# Patient Record
Sex: FEMALE | Race: WHITE | Employment: FULL TIME | URBAN - METROPOLITAN AREA
[De-identification: names, ages, dates, MRNs, and addresses within clinical notes are randomized per-mention and may not be internally consistent; named-entity substitution may affect disease eponyms.]

---

## 2016-05-15 LAB
BASOPHILS ABSOLUTE: 0.1 /ΜL
BASOPHILS RELATIVE PERCENT: 1 %
EOSINOPHILS ABSOLUTE: 0.2 /ΜL
EOSINOPHILS RELATIVE PERCENT: 3 %
HCT VFR BLD CALC: 42.2 % (ref 36–46)
HEMOGLOBIN: 14.5 G/DL (ref 12–16)
LYMPHOCYTES ABSOLUTE: 1.6 /ΜL
LYMPHOCYTES RELATIVE PERCENT: 23 %
MCH RBC QN AUTO: 30.4 PG
MCHC RBC AUTO-ENTMCNC: 34.4 G/DL
MCV RBC AUTO: 89 FL
MONOCYTES ABSOLUTE: 0.5 /ΜL
MONOCYTES RELATIVE PERCENT: 7 %
NEUTROPHILS ABSOLUTE: 4.5 /ΜL
NEUTROPHILS RELATIVE PERCENT: 66 %
PDW BLD-RTO: 14 %
PLATELET # BLD: 231 K/ΜL
PMV BLD AUTO: NORMAL FL
RBC # BLD: 4.77 10^6/ΜL
WBC # BLD: 6.9 10^3/ML

## 2017-05-08 ENCOUNTER — TELEPHONE (OUTPATIENT)
Dept: PHYSICAL MEDICINE AND REHAB | Age: 57
End: 2017-05-08

## 2017-05-09 ENCOUNTER — OFFICE VISIT (OUTPATIENT)
Dept: PHYSICAL MEDICINE AND REHAB | Age: 57
End: 2017-05-09

## 2017-05-09 DIAGNOSIS — M79.601 PARESTHESIA AND PAIN OF BOTH UPPER EXTREMITIES: ICD-10-CM

## 2017-05-09 DIAGNOSIS — M54.12 C7 RADICULOPATHY: Primary | ICD-10-CM

## 2017-05-09 DIAGNOSIS — R20.2 PARESTHESIA AND PAIN OF BOTH UPPER EXTREMITIES: ICD-10-CM

## 2017-05-09 DIAGNOSIS — M79.602 PARESTHESIA AND PAIN OF BOTH UPPER EXTREMITIES: ICD-10-CM

## 2017-05-09 DIAGNOSIS — E11.42 DIABETIC SENSORIMOTOR POLYNEUROPATHY (HCC): ICD-10-CM

## 2017-05-09 PROCEDURE — 95886 MUSC TEST DONE W/N TEST COMP: CPT | Performed by: PHYSICAL MEDICINE & REHABILITATION

## 2017-05-09 PROCEDURE — 95911 NRV CNDJ TEST 9-10 STUDIES: CPT | Performed by: PHYSICAL MEDICINE & REHABILITATION

## 2018-04-06 ENCOUNTER — HOSPITAL ENCOUNTER (OUTPATIENT)
Dept: MAMMOGRAPHY | Age: 58
Discharge: HOME | End: 2018-04-06
Attending: FAMILY MEDICINE | Admitting: FAMILY MEDICINE

## 2018-04-06 DIAGNOSIS — Z12.31 SCREENING MAMMOGRAM, ENCOUNTER FOR: ICD-10-CM

## 2019-01-15 ENCOUNTER — OFFICE VISIT (OUTPATIENT)
Dept: INTERNAL MEDICINE CLINIC | Age: 59
End: 2019-01-15
Payer: COMMERCIAL

## 2019-01-15 ENCOUNTER — TELEPHONE (OUTPATIENT)
Dept: INTERNAL MEDICINE CLINIC | Age: 59
End: 2019-01-15

## 2019-01-15 VITALS
HEART RATE: 62 BPM | HEIGHT: 63 IN | DIASTOLIC BLOOD PRESSURE: 82 MMHG | OXYGEN SATURATION: 98 % | SYSTOLIC BLOOD PRESSURE: 130 MMHG | WEIGHT: 293 LBS | BODY MASS INDEX: 51.91 KG/M2

## 2019-01-15 DIAGNOSIS — E03.9 HYPOTHYROIDISM, UNSPECIFIED TYPE: ICD-10-CM

## 2019-01-15 DIAGNOSIS — E11.9 TYPE 2 DIABETES MELLITUS WITHOUT COMPLICATION, WITHOUT LONG-TERM CURRENT USE OF INSULIN (HCC): Primary | ICD-10-CM

## 2019-01-15 DIAGNOSIS — Z12.11 SCREENING FOR COLON CANCER: ICD-10-CM

## 2019-01-15 DIAGNOSIS — Z79.899 LONG TERM USE OF DRUG: ICD-10-CM

## 2019-01-15 DIAGNOSIS — J01.90 ACUTE NON-RECURRENT SINUSITIS, UNSPECIFIED LOCATION: ICD-10-CM

## 2019-01-15 DIAGNOSIS — Z11.59 SCREENING FOR VIRAL DISEASE: ICD-10-CM

## 2019-01-15 DIAGNOSIS — E78.5 HYPERLIPIDEMIA, UNSPECIFIED HYPERLIPIDEMIA TYPE: ICD-10-CM

## 2019-01-15 DIAGNOSIS — I10 ESSENTIAL HYPERTENSION: ICD-10-CM

## 2019-01-15 DIAGNOSIS — M25.562 ACUTE PAIN OF LEFT KNEE: ICD-10-CM

## 2019-01-15 PROCEDURE — 99214 OFFICE O/P EST MOD 30 MIN: CPT | Performed by: FAMILY MEDICINE

## 2019-01-15 RX ORDER — FLUTICASONE PROPIONATE 50 MCG
SPRAY, SUSPENSION (ML) NASAL
COMMUNITY
Start: 2019-01-02 | End: 2019-01-15

## 2019-01-15 RX ORDER — HYDROCHLOROTHIAZIDE 25 MG/1
50 TABLET ORAL DAILY
COMMUNITY
End: 2019-03-25 | Stop reason: SDUPTHER

## 2019-01-15 RX ORDER — LEVOTHYROXINE SODIUM 175 UG/1
175 TABLET ORAL DAILY
COMMUNITY
End: 2019-03-25 | Stop reason: SDUPTHER

## 2019-01-15 RX ORDER — LOSARTAN POTASSIUM 100 MG/1
100 TABLET ORAL DAILY
COMMUNITY
End: 2019-03-25 | Stop reason: SDUPTHER

## 2019-01-15 RX ORDER — PAROXETINE 10 MG/1
10 TABLET, FILM COATED ORAL EVERY MORNING
COMMUNITY
End: 2019-03-25 | Stop reason: SDUPTHER

## 2019-01-15 RX ORDER — AMLODIPINE BESYLATE 5 MG/1
TABLET ORAL
COMMUNITY
Start: 2018-12-30 | End: 2019-01-15

## 2019-01-15 RX ORDER — LEVOTHYROXINE SODIUM 175 MCG
TABLET ORAL
COMMUNITY
Start: 2019-01-13 | End: 2019-01-15

## 2019-01-15 RX ORDER — GLYBURIDE 5 MG/1
TABLET ORAL
COMMUNITY
Start: 2018-11-04 | End: 2019-01-15

## 2019-01-15 RX ORDER — GLYBURIDE 5 MG/1
5 TABLET ORAL
COMMUNITY
End: 2019-01-25 | Stop reason: SDUPTHER

## 2019-01-15 RX ORDER — AMLODIPINE BESYLATE 5 MG/1
5 TABLET ORAL DAILY
COMMUNITY
End: 2019-03-25 | Stop reason: SDUPTHER

## 2019-01-15 RX ORDER — MONTELUKAST SODIUM 10 MG/1
TABLET ORAL
COMMUNITY
Start: 2018-12-16 | End: 2019-01-15

## 2019-01-15 RX ORDER — PAROXETINE 10 MG/1
TABLET, FILM COATED ORAL
COMMUNITY
Start: 2018-10-20 | End: 2019-01-15

## 2019-01-15 RX ORDER — AZITHROMYCIN 250 MG/1
250 TABLET, FILM COATED ORAL SEE ADMIN INSTRUCTIONS
Qty: 6 TABLET | Refills: 0 | Status: SHIPPED | OUTPATIENT
Start: 2019-01-15 | End: 2019-01-20

## 2019-01-15 ASSESSMENT — PATIENT HEALTH QUESTIONNAIRE - PHQ9
2. FEELING DOWN, DEPRESSED OR HOPELESS: 0
1. LITTLE INTEREST OR PLEASURE IN DOING THINGS: 0
SUM OF ALL RESPONSES TO PHQ QUESTIONS 1-9: 0
SUM OF ALL RESPONSES TO PHQ9 QUESTIONS 1 & 2: 0
SUM OF ALL RESPONSES TO PHQ QUESTIONS 1-9: 0

## 2019-01-20 ASSESSMENT — ENCOUNTER SYMPTOMS
RHINORRHEA: 1
SHORTNESS OF BREATH: 0
BACK PAIN: 0
NAUSEA: 0
COUGH: 0
SINUS PAIN: 1
ABDOMINAL PAIN: 0

## 2019-01-25 RX ORDER — GLYBURIDE 5 MG/1
TABLET ORAL
Qty: 270 TABLET | Refills: 0 | Status: SHIPPED | OUTPATIENT
Start: 2019-01-25 | End: 2019-03-25 | Stop reason: SDUPTHER

## 2019-02-18 ENCOUNTER — TELEPHONE (OUTPATIENT)
Dept: INTERNAL MEDICINE CLINIC | Age: 59
End: 2019-02-18

## 2019-02-19 DIAGNOSIS — Z12.11 SCREENING FOR COLON CANCER: ICD-10-CM

## 2019-03-25 RX ORDER — LEVOTHYROXINE SODIUM 175 UG/1
175 TABLET ORAL DAILY
Qty: 90 TABLET | Refills: 0 | Status: SHIPPED | OUTPATIENT
Start: 2019-03-25 | End: 2019-08-06 | Stop reason: SDUPTHER

## 2019-03-25 RX ORDER — HYDROCHLOROTHIAZIDE 25 MG/1
50 TABLET ORAL DAILY
Qty: 180 TABLET | Refills: 0 | Status: SHIPPED | OUTPATIENT
Start: 2019-03-25 | End: 2019-06-24 | Stop reason: SDUPTHER

## 2019-03-25 RX ORDER — LOSARTAN POTASSIUM 100 MG/1
100 TABLET ORAL DAILY
Qty: 90 TABLET | Refills: 0 | Status: SHIPPED | OUTPATIENT
Start: 2019-03-25 | End: 2019-08-06 | Stop reason: SDUPTHER

## 2019-03-25 RX ORDER — GLYBURIDE 5 MG/1
TABLET ORAL
Qty: 270 TABLET | Refills: 0 | Status: SHIPPED | OUTPATIENT
Start: 2019-03-25 | End: 2019-07-28 | Stop reason: SDUPTHER

## 2019-03-25 RX ORDER — AMLODIPINE BESYLATE 5 MG/1
5 TABLET ORAL DAILY
Qty: 90 TABLET | Refills: 0 | Status: SHIPPED | OUTPATIENT
Start: 2019-03-25 | End: 2019-06-24 | Stop reason: SDUPTHER

## 2019-03-25 RX ORDER — PAROXETINE 10 MG/1
10 TABLET, FILM COATED ORAL EVERY MORNING
Qty: 90 TABLET | Refills: 0 | Status: SHIPPED | OUTPATIENT
Start: 2019-03-25 | End: 2020-08-24

## 2019-03-26 DIAGNOSIS — M25.562 ACUTE PAIN OF LEFT KNEE: ICD-10-CM

## 2019-04-06 LAB
ANTIBODY: 0.2
AVERAGE GLUCOSE: ABNORMAL
CHOLESTEROL, TOTAL: 206 MG/DL
CHOLESTEROL/HDL RATIO: ABNORMAL
HBA1C MFR BLD: 8.6 %
HDLC SERPL-MCNC: 41 MG/DL (ref 35–70)
LDL CHOLESTEROL CALCULATED: 128 MG/DL (ref 0–160)
T4 FREE: 1.61
TRIGL SERPL-MCNC: 183 MG/DL
TSH SERPL DL<=0.05 MIU/L-ACNC: 1.31 UIU/ML
VLDLC SERPL CALC-MCNC: 37 MG/DL

## 2019-04-08 LAB
ALBUMIN SERPL-MCNC: 4.6 G/DL
ALP BLD-CCNC: 77 U/L
ALT SERPL-CCNC: 34 U/L
ANION GAP SERPL CALCULATED.3IONS-SCNC: NORMAL MMOL/L
AST SERPL-CCNC: 31 U/L
BILIRUB SERPL-MCNC: 0.9 MG/DL (ref 0.1–1.4)
BUN BLDV-MCNC: 16 MG/DL
CALCIUM SERPL-MCNC: 10.1 MG/DL
CHLORIDE BLD-SCNC: 100 MMOL/L
CO2: 22 MMOL/L
CREAT SERPL-MCNC: 0.67 MG/DL
GFR CALCULATED: 97
GLUCOSE BLD-MCNC: 221 MG/DL
POTASSIUM SERPL-SCNC: 3.7 MMOL/L
SODIUM BLD-SCNC: 143 MMOL/L
TOTAL PROTEIN: 8.1

## 2019-04-09 ENCOUNTER — HOSPITAL ENCOUNTER (OUTPATIENT)
Dept: MAMMOGRAPHY | Age: 59
Discharge: HOME | End: 2019-04-09
Payer: COMMERCIAL

## 2019-04-09 DIAGNOSIS — Z12.31 ENCOUNTER FOR SCREENING MAMMOGRAM FOR BREAST CANCER: ICD-10-CM

## 2019-04-09 PROCEDURE — 77067 SCR MAMMO BI INCL CAD: CPT

## 2019-04-15 ENCOUNTER — OFFICE VISIT (OUTPATIENT)
Dept: INTERNAL MEDICINE CLINIC | Age: 59
End: 2019-04-15
Payer: COMMERCIAL

## 2019-04-15 VITALS
BODY MASS INDEX: 51.91 KG/M2 | WEIGHT: 293 LBS | OXYGEN SATURATION: 98 % | HEART RATE: 76 BPM | HEIGHT: 63 IN | DIASTOLIC BLOOD PRESSURE: 88 MMHG | SYSTOLIC BLOOD PRESSURE: 138 MMHG

## 2019-04-15 DIAGNOSIS — E78.5 HYPERLIPIDEMIA, UNSPECIFIED HYPERLIPIDEMIA TYPE: ICD-10-CM

## 2019-04-15 DIAGNOSIS — E11.65 TYPE 2 DIABETES MELLITUS WITH HYPERGLYCEMIA, WITHOUT LONG-TERM CURRENT USE OF INSULIN (HCC): Primary | ICD-10-CM

## 2019-04-15 DIAGNOSIS — Z79.899 LONG TERM USE OF DRUG: ICD-10-CM

## 2019-04-15 DIAGNOSIS — E03.9 HYPOTHYROIDISM, UNSPECIFIED TYPE: ICD-10-CM

## 2019-04-15 DIAGNOSIS — R05.9 COUGH: ICD-10-CM

## 2019-04-15 DIAGNOSIS — J01.00 ACUTE NON-RECURRENT MAXILLARY SINUSITIS: ICD-10-CM

## 2019-04-15 PROCEDURE — 99214 OFFICE O/P EST MOD 30 MIN: CPT | Performed by: FAMILY MEDICINE

## 2019-04-15 RX ORDER — PRAVASTATIN SODIUM 40 MG
40 TABLET ORAL DAILY
Qty: 90 TABLET | Refills: 0 | Status: SHIPPED | OUTPATIENT
Start: 2019-04-15 | End: 2019-06-24 | Stop reason: SDUPTHER

## 2019-04-15 RX ORDER — AZITHROMYCIN 250 MG/1
250 TABLET, FILM COATED ORAL SEE ADMIN INSTRUCTIONS
Qty: 6 TABLET | Refills: 0 | Status: SHIPPED | OUTPATIENT
Start: 2019-04-15 | End: 2019-04-20

## 2019-04-15 RX ORDER — BENZONATATE 200 MG/1
200 CAPSULE ORAL 3 TIMES DAILY PRN
Qty: 30 CAPSULE | Refills: 0 | Status: SHIPPED | OUTPATIENT
Start: 2019-04-15 | End: 2019-04-22

## 2019-04-20 ASSESSMENT — ENCOUNTER SYMPTOMS
EYES NEGATIVE: 1
ABDOMINAL PAIN: 0
COUGH: 1
CHEST TIGHTNESS: 0
WHEEZING: 0
RHINORRHEA: 1
BACK PAIN: 0
SHORTNESS OF BREATH: 0
NAUSEA: 0
BLOOD IN STOOL: 0
SINUS PRESSURE: 1

## 2019-04-20 NOTE — PROGRESS NOTES
Topics    Alcohol use: No    Drug use: No       Current Outpatient Medications   Medication Sig Dispense Refill    benzonatate (TESSALON) 200 MG capsule Take 1 capsule by mouth 3 times daily as needed for Cough 30 capsule 0    Dulaglutide (TRULICITY) 0.22 ZD/4.3CO SOPN Inject 0.75 mg into the skin once a week 1 pen 2    pravastatin (PRAVACHOL) 40 MG tablet Take 1 tablet by mouth daily 90 tablet 0    azithromycin (ZITHROMAX) 250 MG tablet Take 1 tablet by mouth See Admin Instructions for 5 days 500mg on day 1 followed by 250mg on days 2 - 5 6 tablet 0    glyBURIDE (DIABETA) 5 MG tablet Take 1 tablet by mouth every morning and 2 tablets every evening with meals 270 tablet 0    losartan (COZAAR) 100 MG tablet Take 1 tablet by mouth daily 90 tablet 0    amLODIPine (NORVASC) 5 MG tablet Take 1 tablet by mouth daily 90 tablet 0    levothyroxine (SYNTHROID) 175 MCG tablet Take 1 tablet by mouth Daily 90 tablet 0    hydrochlorothiazide (HYDRODIURIL) 25 MG tablet Take 2 tablets by mouth daily 180 tablet 0    metFORMIN (GLUCOPHAGE) 1000 MG tablet Take 1 tablet by mouth 2 times daily (with meals) 180 tablet 0    PARoxetine (PAXIL) 10 MG tablet Take 1 tablet by mouth every morning 90 tablet 0     No current facility-administered medications for this visit. OBJECTIVE:    /88 (Site: Right Upper Arm, Position: Sitting, Cuff Size: Medium Adult)   Pulse 76   Ht 5' 2.99\" (1.6 m)   Wt (!) 310 lb (140.6 kg)   SpO2 98%   BMI 54.93 kg/m²     Physical Exam   Constitutional: She is oriented to person, place, and time. She appears well-developed. No distress. HENT:   Right Ear: External ear normal.   Left Ear: External ear normal.   Nose: Rhinorrhea and sinus tenderness present. Mouth/Throat: Oropharynx is clear and moist.   Eyes: Pupils are equal, round, and reactive to light. EOM are normal.   Neck: Neck supple. Cardiovascular: Normal rate, regular rhythm and normal heart sounds.    Pulmonary/Chest:

## 2019-04-29 ENCOUNTER — TELEPHONE (OUTPATIENT)
Dept: INTERNAL MEDICINE CLINIC | Age: 59
End: 2019-04-29

## 2019-04-29 NOTE — TELEPHONE ENCOUNTER
Patient was given Trulicity 2 weeks ago at a low dose. There have been no adverse reactions to this medication. Her morning BS have been running around 170 she would like a higher dose called into [facility].

## 2019-04-30 DIAGNOSIS — E03.9 HYPOTHYROIDISM, UNSPECIFIED TYPE: ICD-10-CM

## 2019-04-30 DIAGNOSIS — Z11.59 SCREENING FOR VIRAL DISEASE: ICD-10-CM

## 2019-04-30 DIAGNOSIS — I10 ESSENTIAL HYPERTENSION: ICD-10-CM

## 2019-04-30 DIAGNOSIS — Z79.899 LONG TERM USE OF DRUG: ICD-10-CM

## 2019-06-24 RX ORDER — PRAVASTATIN SODIUM 40 MG
TABLET ORAL
Qty: 90 TABLET | Refills: 0 | Status: SHIPPED | OUTPATIENT
Start: 2019-06-24 | End: 2019-10-06 | Stop reason: SDUPTHER

## 2019-06-24 RX ORDER — AMLODIPINE BESYLATE 5 MG/1
TABLET ORAL
Qty: 90 TABLET | Refills: 0 | Status: SHIPPED | OUTPATIENT
Start: 2019-06-24 | End: 2019-09-03

## 2019-06-24 RX ORDER — HYDROCHLOROTHIAZIDE 25 MG/1
TABLET ORAL
Qty: 180 TABLET | Refills: 0 | Status: SHIPPED | OUTPATIENT
Start: 2019-06-24 | End: 2019-09-02 | Stop reason: SDUPTHER

## 2019-07-09 LAB
A/G RATIO: NORMAL
ALBUMIN SERPL-MCNC: 4.3 G/DL
ALP BLD-CCNC: 74 U/L
ALT SERPL-CCNC: 43 U/L
AST SERPL-CCNC: 39 U/L
AVERAGE GLUCOSE: NORMAL
BILIRUB SERPL-MCNC: 0.9 MG/DL (ref 0.1–1.4)
BILIRUBIN DIRECT: 0.26 MG/DL
BILIRUBIN, INDIRECT: NORMAL
CHOLESTEROL, TOTAL: 143 MG/DL
CHOLESTEROL/HDL RATIO: NORMAL
GLOBULIN: NORMAL
HBA1C MFR BLD: 8.3 %
HDLC SERPL-MCNC: 47 MG/DL (ref 35–70)
LDL CHOLESTEROL CALCULATED: 74 MG/DL (ref 0–160)
PROTEIN TOTAL: 7.5 G/DL
TRIGL SERPL-MCNC: 112 MG/DL
VLDLC SERPL CALC-MCNC: 22 MG/DL

## 2019-07-19 ENCOUNTER — OFFICE VISIT (OUTPATIENT)
Dept: INTERNAL MEDICINE CLINIC | Age: 59
End: 2019-07-19
Payer: COMMERCIAL

## 2019-07-19 VITALS
HEART RATE: 88 BPM | SYSTOLIC BLOOD PRESSURE: 122 MMHG | HEIGHT: 63 IN | BODY MASS INDEX: 51.91 KG/M2 | OXYGEN SATURATION: 92 % | WEIGHT: 293 LBS | DIASTOLIC BLOOD PRESSURE: 84 MMHG

## 2019-07-19 DIAGNOSIS — I10 ESSENTIAL HYPERTENSION: ICD-10-CM

## 2019-07-19 DIAGNOSIS — T14.8XXA BRUISING: ICD-10-CM

## 2019-07-19 DIAGNOSIS — E11.65 TYPE 2 DIABETES MELLITUS WITH HYPERGLYCEMIA, WITHOUT LONG-TERM CURRENT USE OF INSULIN (HCC): Primary | ICD-10-CM

## 2019-07-19 DIAGNOSIS — E78.5 HYPERLIPIDEMIA, UNSPECIFIED HYPERLIPIDEMIA TYPE: ICD-10-CM

## 2019-07-19 DIAGNOSIS — E03.9 HYPOTHYROIDISM, UNSPECIFIED TYPE: ICD-10-CM

## 2019-07-19 PROCEDURE — 99214 OFFICE O/P EST MOD 30 MIN: CPT | Performed by: FAMILY MEDICINE

## 2019-07-21 ASSESSMENT — ENCOUNTER SYMPTOMS
BLOOD IN STOOL: 0
CONSTIPATION: 0
EYES NEGATIVE: 1
DIARRHEA: 0
ROS SKIN COMMENTS: BRUISING
ABDOMINAL PAIN: 0
SHORTNESS OF BREATH: 0
CHEST TIGHTNESS: 0
NAUSEA: 0
BACK PAIN: 0
WHEEZING: 0

## 2019-07-29 RX ORDER — GLYBURIDE 5 MG/1
TABLET ORAL
Qty: 270 TABLET | Refills: 1 | Status: SHIPPED | OUTPATIENT
Start: 2019-07-29 | End: 2020-01-20

## 2019-08-06 RX ORDER — LEVOTHYROXINE SODIUM 175 UG/1
TABLET ORAL
Qty: 90 TABLET | Refills: 0 | Status: SHIPPED | OUTPATIENT
Start: 2019-08-06 | End: 2019-11-04 | Stop reason: SDUPTHER

## 2019-08-06 RX ORDER — LOSARTAN POTASSIUM 100 MG/1
TABLET ORAL
Qty: 90 TABLET | Refills: 0 | Status: SHIPPED | OUTPATIENT
Start: 2019-08-06 | End: 2019-11-04 | Stop reason: SDUPTHER

## 2019-08-12 ENCOUNTER — TELEPHONE (OUTPATIENT)
Dept: INTERNAL MEDICINE CLINIC | Age: 59
End: 2019-08-12

## 2019-08-12 DIAGNOSIS — E11.65 TYPE 2 DIABETES MELLITUS WITH HYPERGLYCEMIA, WITHOUT LONG-TERM CURRENT USE OF INSULIN (HCC): ICD-10-CM

## 2019-08-25 ENCOUNTER — HOSPITAL ENCOUNTER (EMERGENCY)
Age: 59
Discharge: HOME | End: 2019-08-25
Payer: COMMERCIAL

## 2019-08-25 VITALS
DIASTOLIC BLOOD PRESSURE: 82 MMHG | HEIGHT: 63 IN | SYSTOLIC BLOOD PRESSURE: 125 MMHG | WEIGHT: 293 LBS | HEART RATE: 91 BPM | TEMPERATURE: 98.1 F | BODY MASS INDEX: 51.91 KG/M2 | RESPIRATION RATE: 18 BRPM | OXYGEN SATURATION: 96 %

## 2019-08-25 DIAGNOSIS — S81.011A KNEE LACERATION, RIGHT, INITIAL ENCOUNTER: ICD-10-CM

## 2019-08-25 DIAGNOSIS — Z23 TETANUS TOXOID VACCINATION ADMINISTERED AT CURRENT VISIT: Primary | ICD-10-CM
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
## 2019-08-25 LAB
CREATININE, URINE: 67.3
MICROALBUMIN/CREAT 24H UR: 30.6 MG/G{CREAT}
MICROALBUMIN/CREAT UR-RTO: 45.5

## 2019-08-25 PROCEDURE — 90715 TDAP VACCINE 7 YRS/> IM: CPT | Performed by: NURSE PRACTITIONER

## 2019-08-25 PROCEDURE — 99282 EMERGENCY DEPT VISIT SF MDM: CPT

## 2019-08-25 PROCEDURE — 90471 IMMUNIZATION ADMIN: CPT | Performed by: NURSE PRACTITIONER

## 2019-08-25 PROCEDURE — 2500000003 HC RX 250 WO HCPCS: Performed by: NURSE PRACTITIONER

## 2019-08-25 PROCEDURE — 6360000002 HC RX W HCPCS: Performed by: NURSE PRACTITIONER

## 2019-08-25 PROCEDURE — 4500000027

## 2019-08-25 RX ORDER — CEPHALEXIN 500 MG/1
500 CAPSULE ORAL 4 TIMES DAILY
Qty: 28 CAPSULE | Refills: 0 | Status: SHIPPED | OUTPATIENT
Start: 2019-08-25 | End: 2019-09-01

## 2019-08-25 RX ORDER — LIDOCAINE HYDROCHLORIDE AND EPINEPHRINE 10; 10 MG/ML; UG/ML
20 INJECTION, SOLUTION INFILTRATION; PERINEURAL ONCE
Status: COMPLETED | OUTPATIENT
Start: 2019-08-25 | End: 2019-08-25

## 2019-08-25 RX ADMIN — LIDOCAINE HYDROCHLORIDE AND EPINEPHRINE 20 ML: 10; 10 INJECTION, SOLUTION INFILTRATION; PERINEURAL at 14:50

## 2019-08-25 RX ADMIN — TETANUS TOXOID, REDUCED DIPHTHERIA TOXOID AND ACELLULAR PERTUSSIS VACCINE, ADSORBED 0.5 ML: 5; 2.5; 8; 8; 2.5 SUSPENSION INTRAMUSCULAR at 14:49

## 2019-08-25 ASSESSMENT — ENCOUNTER SYMPTOMS
CONSTIPATION: 0
BLOOD IN STOOL: 0
DIARRHEA: 0
RHINORRHEA: 0
SORE THROAT: 0
ABDOMINAL PAIN: 0
SHORTNESS OF BREATH: 0
NAUSEA: 0
VOMITING: 0

## 2019-08-25 ASSESSMENT — PAIN SCALES - GENERAL: PAINLEVEL_OUTOF10: 2

## 2019-09-03 ENCOUNTER — OFFICE VISIT (OUTPATIENT)
Dept: INTERNAL MEDICINE CLINIC | Age: 59
End: 2019-09-03
Payer: COMMERCIAL

## 2019-09-03 ENCOUNTER — TELEPHONE (OUTPATIENT)
Dept: INTERNAL MEDICINE CLINIC | Age: 59
End: 2019-09-03

## 2019-09-03 VITALS
SYSTOLIC BLOOD PRESSURE: 114 MMHG | WEIGHT: 293 LBS | OXYGEN SATURATION: 98 % | HEIGHT: 63 IN | BODY MASS INDEX: 51.91 KG/M2 | DIASTOLIC BLOOD PRESSURE: 78 MMHG | HEART RATE: 94 BPM

## 2019-09-03 DIAGNOSIS — Z48.02 VISIT FOR SUTURE REMOVAL: ICD-10-CM

## 2019-09-03 DIAGNOSIS — S81.011D LACERATION OF RIGHT KNEE, SUBSEQUENT ENCOUNTER: Primary | ICD-10-CM

## 2019-09-03 DIAGNOSIS — T14.8XXA BRUISING: ICD-10-CM

## 2019-09-03 PROCEDURE — 99213 OFFICE O/P EST LOW 20 MIN: CPT | Performed by: FAMILY MEDICINE

## 2019-09-03 RX ORDER — HYDROCHLOROTHIAZIDE 25 MG/1
TABLET ORAL
Qty: 180 TABLET | Refills: 1 | Status: SHIPPED | OUTPATIENT
Start: 2019-09-03 | End: 2020-06-10

## 2019-09-05 ENCOUNTER — TELEPHONE (OUTPATIENT)
Dept: INTERNAL MEDICINE CLINIC | Age: 59
End: 2019-09-05

## 2019-09-08 ASSESSMENT — ENCOUNTER SYMPTOMS
BLOOD IN STOOL: 0
ABDOMINAL PAIN: 0
SHORTNESS OF BREATH: 0
COUGH: 0
BACK PAIN: 0
NAUSEA: 0

## 2019-09-08 NOTE — PROGRESS NOTES
tablet 0    levothyroxine (SYNTHROID) 175 MCG tablet TAKE 1 TABLET DAILY 90 tablet 0    glyBURIDE (DIABETA) 5 MG tablet TAKE 1 TABLET EVERY MORNING AND 2 TABLETS EVERY EVENING WITH MEALS 270 tablet 1    pravastatin (PRAVACHOL) 40 MG tablet TAKE 1 TABLET DAILY 90 tablet 0    Dulaglutide (TRULICITY) 1.5 HL/7.4QR SOPN Inject 1.5 mg into the skin once a week 3 pen 0    PARoxetine (PAXIL) 10 MG tablet Take 1 tablet by mouth every morning 90 tablet 0    metFORMIN (GLUCOPHAGE) 1000 MG tablet TAKE 1 TABLET TWICE A DAY WITH MEALS 180 tablet 1    hydrochlorothiazide (HYDRODIURIL) 25 MG tablet TAKE 2 TABLETS DAILY 180 tablet 1     No current facility-administered medications for this visit. OBJECTIVE:    /78   Pulse 94   Ht 5' 2.99\" (1.6 m)   Wt (!) 315 lb (142.9 kg)   SpO2 98%   Breastfeeding? No   BMI 55.81 kg/m²     Physical Exam   Constitutional: She is oriented to person, place, and time. She appears well-developed. No distress. Eyes: Conjunctivae are normal.   Neck: Neck supple. Cardiovascular: Normal rate, regular rhythm and normal heart sounds. Pulmonary/Chest: Effort normal and breath sounds normal. No respiratory distress. Abdominal: Soft. Bowel sounds are normal. There is no tenderness. Neurological: She is alert and oriented to person, place, and time. No cranial nerve deficit. Skin:   Bruising to R breast and R elbow. R knee with small laceration to anterior knee   Vitals reviewed. ASSESSMENT:  1. Laceration of right knee, subsequent encounter    2. Visit for suture removal    3. Bruising        PLAN:  ER report reviewed  Suture removal  Tdap UTD  Use Tylenol prn pain. Avoid Nsaids          Return if symptoms worsen or fail to improve.     Electronically Signed by Joe Chavira DO

## 2019-10-07 RX ORDER — PRAVASTATIN SODIUM 40 MG
TABLET ORAL
Qty: 90 TABLET | Refills: 2 | Status: SHIPPED | OUTPATIENT
Start: 2019-10-07 | End: 2020-06-10

## 2019-10-20 LAB
ALBUMIN SERPL-MCNC: 4.2 G/DL
ALP BLD-CCNC: 78 U/L
ALT SERPL-CCNC: 40 U/L
ANION GAP SERPL CALCULATED.3IONS-SCNC: 1.4 MMOL/L
AST SERPL-CCNC: 33 U/L
AVERAGE GLUCOSE: NORMAL
BASOPHILS ABSOLUTE: 0 /ΜL
BASOPHILS RELATIVE PERCENT: 0 %
BILIRUB SERPL-MCNC: 0.7 MG/DL (ref 0.1–1.4)
BUN BLDV-MCNC: 16 MG/DL
CALCIUM SERPL-MCNC: 9.6 MG/DL
CHLORIDE BLD-SCNC: 100 MMOL/L
CO2: 24 MMOL/L
CREAT SERPL-MCNC: 0.69 MG/DL
EOSINOPHILS ABSOLUTE: 0.2 /ΜL
EOSINOPHILS RELATIVE PERCENT: 3 %
GFR CALCULATED: 23
GLUCOSE BLD-MCNC: 214 MG/DL
HBA1C MFR BLD: 8.6 %
HCT VFR BLD CALC: 43.3 % (ref 36–46)
HEMOGLOBIN: 14.8 G/DL (ref 12–16)
LYMPHOCYTES ABSOLUTE: 2.8 /ΜL
LYMPHOCYTES RELATIVE PERCENT: 33 %
MCH RBC QN AUTO: 29.7 PG
MCHC RBC AUTO-ENTMCNC: 34.2 G/DL
MCV RBC AUTO: 87 FL
MONOCYTES ABSOLUTE: 0.6 /ΜL
MONOCYTES RELATIVE PERCENT: 6 %
NEUTROPHILS ABSOLUTE: 4.9 /ΜL
NEUTROPHILS RELATIVE PERCENT: 58 %
PLATELET # BLD: 223 K/ΜL
PMV BLD AUTO: NORMAL FL
POTASSIUM SERPL-SCNC: 3.8 MMOL/L
RBC # BLD: 4.99 10^6/ΜL
SODIUM BLD-SCNC: 140 MMOL/L
TOTAL PROTEIN: 7.2
WBC # BLD: 8.5 10^3/ML

## 2019-10-29 ENCOUNTER — OFFICE VISIT (OUTPATIENT)
Dept: INTERNAL MEDICINE CLINIC | Age: 59
End: 2019-10-29
Payer: COMMERCIAL

## 2019-10-29 VITALS
HEIGHT: 63 IN | OXYGEN SATURATION: 96 % | HEART RATE: 91 BPM | WEIGHT: 293 LBS | SYSTOLIC BLOOD PRESSURE: 112 MMHG | BODY MASS INDEX: 51.91 KG/M2 | DIASTOLIC BLOOD PRESSURE: 82 MMHG

## 2019-10-29 DIAGNOSIS — E11.65 TYPE 2 DIABETES MELLITUS WITH HYPERGLYCEMIA, WITHOUT LONG-TERM CURRENT USE OF INSULIN (HCC): Primary | ICD-10-CM

## 2019-10-29 DIAGNOSIS — E03.9 HYPOTHYROIDISM, UNSPECIFIED TYPE: ICD-10-CM

## 2019-10-29 DIAGNOSIS — I10 ESSENTIAL HYPERTENSION: ICD-10-CM

## 2019-10-29 PROCEDURE — 99214 OFFICE O/P EST MOD 30 MIN: CPT | Performed by: FAMILY MEDICINE

## 2019-11-02 ASSESSMENT — ENCOUNTER SYMPTOMS
ABDOMINAL PAIN: 0
NAUSEA: 0
BACK PAIN: 0
COUGH: 0
CHEST TIGHTNESS: 0
SHORTNESS OF BREATH: 0

## 2019-11-04 RX ORDER — LEVOTHYROXINE SODIUM 175 UG/1
TABLET ORAL
Qty: 90 TABLET | Refills: 1 | Status: SHIPPED | OUTPATIENT
Start: 2019-11-04 | End: 2020-05-26

## 2019-11-04 RX ORDER — LOSARTAN POTASSIUM 100 MG/1
TABLET ORAL
Qty: 90 TABLET | Refills: 1 | Status: SHIPPED | OUTPATIENT
Start: 2019-11-04 | End: 2020-04-13

## 2019-11-05 DIAGNOSIS — E11.65 TYPE 2 DIABETES MELLITUS WITH HYPERGLYCEMIA, WITHOUT LONG-TERM CURRENT USE OF INSULIN (HCC): ICD-10-CM

## 2019-11-06 ENCOUNTER — TELEPHONE (OUTPATIENT)
Dept: INTERNAL MEDICINE CLINIC | Age: 59
End: 2019-11-06

## 2020-01-20 RX ORDER — GLYBURIDE 5 MG/1
TABLET ORAL
Qty: 270 TABLET | Refills: 2 | Status: SHIPPED | OUTPATIENT
Start: 2020-01-20 | End: 2020-10-20

## 2020-01-20 RX ORDER — EXENATIDE 2 MG/.85ML
INJECTION, SUSPENSION, EXTENDED RELEASE SUBCUTANEOUS
Qty: 10.2 ML | Refills: 2 | Status: SHIPPED | OUTPATIENT
Start: 2020-01-20 | End: 2020-10-05

## 2020-02-04 ENCOUNTER — OFFICE VISIT (OUTPATIENT)
Dept: INTERNAL MEDICINE CLINIC | Age: 60
End: 2020-02-04
Payer: COMMERCIAL

## 2020-02-04 VITALS
HEART RATE: 83 BPM | WEIGHT: 293 LBS | DIASTOLIC BLOOD PRESSURE: 92 MMHG | HEIGHT: 63 IN | SYSTOLIC BLOOD PRESSURE: 130 MMHG | OXYGEN SATURATION: 94 % | BODY MASS INDEX: 51.91 KG/M2

## 2020-02-04 PROCEDURE — 3051F HG A1C>EQUAL 7.0%<8.0%: CPT | Performed by: FAMILY MEDICINE

## 2020-02-04 PROCEDURE — 99214 OFFICE O/P EST MOD 30 MIN: CPT | Performed by: FAMILY MEDICINE

## 2020-02-04 RX ORDER — FLUCONAZOLE 150 MG/1
150 TABLET ORAL
Qty: 2 TABLET | Refills: 0 | Status: SHIPPED | OUTPATIENT
Start: 2020-02-04 | End: 2020-02-10

## 2020-02-04 ASSESSMENT — ENCOUNTER SYMPTOMS
COUGH: 0
CHEST TIGHTNESS: 0
NAUSEA: 0
ABDOMINAL PAIN: 0
BLOOD IN STOOL: 0
SHORTNESS OF BREATH: 0
BACK PAIN: 0

## 2020-02-04 ASSESSMENT — PATIENT HEALTH QUESTIONNAIRE - PHQ9
SUM OF ALL RESPONSES TO PHQ9 QUESTIONS 1 & 2: 0
SUM OF ALL RESPONSES TO PHQ QUESTIONS 1-9: 0
SUM OF ALL RESPONSES TO PHQ QUESTIONS 1-9: 0
2. FEELING DOWN, DEPRESSED OR HOPELESS: 0
1. LITTLE INTEREST OR PLEASURE IN DOING THINGS: 0

## 2020-02-04 NOTE — PROGRESS NOTES
Josiane Rodgers  1960  63 y.o.  female    Chief Complaint   Patient presents with    3 Month Follow-Up         History of Present Illness  Josiane Rodgers is a 63 y.o. female who presents today for a check up. Last labs reviewed. No Cp or Sob. Pt to have eye exam with [facility] on 20.  -DM II- Improving. Last Hba1c 7.0. Elvera Lav was added at the last visit She would like to stay on the the medication. She c/o of a yeast infection however that can be a SE of the med. On Metformin Glyburide and Bydureon  -HTN- Stable on Losartan and HCTZ  -Hypothyroidism- Stable on Levothyroxine 175  -HLD- Stable on Pravastatin 40    Review of Systems   Constitutional: Negative for diaphoresis and fever. HENT: Negative. Respiratory: Negative for cough, chest tightness and shortness of breath. Cardiovascular: Negative for chest pain and palpitations. Gastrointestinal: Negative for abdominal pain, blood in stool and nausea. Genitourinary: Positive for vaginal discharge (yeast). Negative for difficulty urinating. Musculoskeletal: Negative for back pain. Neurological: Negative for dizziness and headaches. Psychiatric/Behavioral: Negative for dysphoric mood. No Known Allergies    Past Medical History:   Diagnosis Date    Allergic rhinitis     Anemia     Depressive disorder     Diabetes mellitus ([address]     Hyperlipidemia     Hypertension     Hypothyroid     Morbid obesity ([address]     Osteoarthrosis     Tricompartmental arthritis of the left knee    Radiculopathy, cervical region     Sensorimotor neuropathy 2014    low grade of Upper Extremities    Stricture and stenosis of cervix uteri        Past Surgical History:   Procedure Laterality Date    BACK SURGERY       SECTION      ENDOMETRIAL ABLATION         History reviewed. No pertinent family history. Social History     Tobacco Use    Smoking status: Never Smoker    Smokeless tobacco: Never Used   Substance Use Topics    Alcohol use:  No  Drug use: No       Current Outpatient Medications   Medication Sig Dispense Refill    fluconazole (DIFLUCAN) 150 MG tablet Take 1 tablet by mouth every 72 hours for 6 days 2 tablet 0    BYDUREON BCISE 2 MG/0.85ML AUIJ INJECT 2 MG UNDER THE SKIN ONCE A WEEK (DISCONTINUE TRULICITY) 36.6 mL 2    glyBURIDE (DIABETA) 5 MG tablet TAKE 1 TABLET EVERY MORNING AND 2 TABLETS EVERY EVENING WITH MEALS 270 tablet 2    empagliflozin (JARDIANCE) 10 MG tablet Take 1 tablet by mouth daily 90 tablet 1    losartan (COZAAR) 100 MG tablet TAKE 1 TABLET DAILY 90 tablet 1    levothyroxine (SYNTHROID) 175 MCG tablet TAKE 1 TABLET DAILY 90 tablet 1    pravastatin (PRAVACHOL) 40 MG tablet TAKE 1 TABLET DAILY 90 tablet 2    metFORMIN (GLUCOPHAGE) 1000 MG tablet TAKE 1 TABLET TWICE A DAY WITH MEALS 180 tablet 1    hydrochlorothiazide (HYDRODIURIL) 25 MG tablet TAKE 2 TABLETS DAILY 180 tablet 1    PARoxetine (PAXIL) 10 MG tablet Take 1 tablet by mouth every morning 90 tablet 0     No current facility-administered medications for this visit. OBJECTIVE:    BP (!) 130/92   Pulse 83   Ht 5' 2.99\" (1.6 m)   Wt (!) 306 lb 12.8 oz (139.2 kg)   SpO2 94%   Breastfeeding No   BMI 54.36 kg/m²     Physical Exam  Vitals signs reviewed. Constitutional:       General: She is not in acute distress. Appearance: She is well-developed. Eyes:      Conjunctiva/sclera: Conjunctivae normal.   Neck:      Musculoskeletal: Neck supple. Cardiovascular:      Rate and Rhythm: Normal rate and regular rhythm. Heart sounds: Normal heart sounds. Pulmonary:      Effort: Pulmonary effort is normal. No respiratory distress. Breath sounds: Normal breath sounds. Abdominal:      General: Bowel sounds are normal.      Palpations: Abdomen is soft. Tenderness: There is no abdominal tenderness. Musculoskeletal:         General: No swelling. Neurological:      Mental Status: She is alert and oriented to person, place, and time. Cranial Nerves: No cranial nerve deficit. Psychiatric:         Mood and Affect: Mood normal.         ASSESSMENT:  1. Type 2 diabetes mellitus with hyperglycemia, without long-term current use of insulin ([address]    2. Essential hypertension    3. Hypothyroidism, unspecified type    4. Hyperlipidemia, unspecified hyperlipidemia type        PLAN:    Orders Placed This Encounter   Procedures    BASIC METABOLIC PANEL    Hemoglobin A1C       Orders Placed This Encounter   Medications    fluconazole (DIFLUCAN) 150 MG tablet     Sig: Take 1 tablet by mouth every 72 hours for 6 days     Dispense:  2 tablet     Refill:  0   Obtain lab as directed  Start Diflucan  ADR's explained  Continue medications  Stay hydrated  The patient is asked to make an attempt to improve diet and exercise patterns to aid in medical management of this problem. Return for Follow up in 4 to 5 months for diabetes.     Electronically Signed by Donna Mcdonnell DO

## 2020-04-13 RX ORDER — AMLODIPINE BESYLATE 5 MG/1
TABLET ORAL
Qty: 90 TABLET | Refills: 0 | Status: SHIPPED | OUTPATIENT
Start: 2020-04-13 | End: 2020-07-06

## 2020-04-13 RX ORDER — LOSARTAN POTASSIUM 100 MG/1
TABLET ORAL
Qty: 90 TABLET | Refills: 0 | Status: SHIPPED | OUTPATIENT
Start: 2020-04-13 | End: 2020-08-03

## 2020-05-26 RX ORDER — LEVOTHYROXINE SODIUM 175 UG/1
TABLET ORAL
Qty: 90 TABLET | Refills: 0 | Status: SHIPPED | OUTPATIENT
Start: 2020-05-26 | End: 2020-08-24

## 2020-06-02 ENCOUNTER — HOSPITAL ENCOUNTER (OUTPATIENT)
Dept: WOMENS IMAGING | Age: 60
Discharge: HOME | End: 2020-06-02
Payer: COMMERCIAL

## 2020-06-02 PROCEDURE — 77067 SCR MAMMO BI INCL CAD: CPT

## 2020-06-08 ENCOUNTER — VIRTUAL VISIT (OUTPATIENT)
Dept: INTERNAL MEDICINE CLINIC | Age: 60
End: 2020-06-08
Payer: COMMERCIAL

## 2020-06-08 PROCEDURE — 99443 PR PHYS/QHP TELEPHONE EVALUATION 21-30 MIN: CPT | Performed by: FAMILY MEDICINE

## 2020-06-08 NOTE — PROGRESS NOTES
Saloni Woods  1960  62 y.o.  female    Chief Complaint   Patient presents with    Check-Up     Patient Location: Home  Provider Location: Home    History of Present Illness  Saloni Woods is a 62 y.o. female who presents today for a telephone consult. . She has been doing well on her medications. Patient Active Problem List    Diagnosis Date Noted    Type 2 diabetes mellitus with hyperglycemia, without long-term current use of insulin ([facility] [address] 2020    Essential hypertension 2020    Hypothyroidism 2020    Hyperlipidemia 2020       Review of Systems   Constitutional: Negative for chills and fever. HENT: Negative. Respiratory: Negative for cough and shortness of breath. Gastrointestinal: Negative for abdominal pain, constipation, diarrhea and nausea. Genitourinary: Negative for dysuria. Neurological: Negative for light-headedness and headaches. Psychiatric/Behavioral: Negative for dysphoric mood. No Known Allergies    Past Medical History:   Diagnosis Date    Allergic rhinitis     Anemia     Depressive disorder     Diabetes mellitus ([facility]     Hyperlipidemia     Hypertension     Hypothyroid     Morbid obesity ([facility] [address]     Osteoarthrosis     Tricompartmental arthritis of the left knee    Radiculopathy, cervical region     Sensorimotor neuropathy 2014    low grade of Upper Extremities    Stricture and stenosis of cervix uteri        Past Surgical History:   Procedure Laterality Date    BACK SURGERY       SECTION      ENDOMETRIAL ABLATION         No family history on file.     Social History     Tobacco Use    Smoking status: Never Smoker    Smokeless tobacco: Never Used   Substance Use Topics    Alcohol use: No    Drug use: No       Current Outpatient Medications   Medication Sig Dispense Refill    levothyroxine (SYNTHROID) 175 MCG tablet TAKE 1 TABLET DAILY 90 tablet 0    metFORMIN (GLUCOPHAGE) 1000 MG tablet TAKE 1 TABLET TWICE A DAY WITH MEALS 180 tablet 0    amLODIPine (NORVASC) 5 MG tablet TAKE 1 TABLET DAILY 90 tablet 0    losartan (COZAAR) 100 MG tablet TAKE 1 TABLET DAILY 90 tablet 0    BYDUREON BCISE 2 MG/0.85ML AUIJ INJECT 2 MG UNDER THE SKIN ONCE A WEEK (DISCONTINUE TRULICITY) 08.0 mL 2    glyBURIDE (DIABETA) 5 MG tablet TAKE 1 TABLET EVERY MORNING AND 2 TABLETS EVERY EVENING WITH MEALS 270 tablet 2    empagliflozin (JARDIANCE) 10 MG tablet Take 1 tablet by mouth daily 90 tablet 1    pravastatin (PRAVACHOL) 40 MG tablet TAKE 1 TABLET DAILY 90 tablet 2    hydrochlorothiazide (HYDRODIURIL) 25 MG tablet TAKE 2 TABLETS DAILY 180 tablet 1    PARoxetine (PAXIL) 10 MG tablet Take 1 tablet by mouth every morning 90 tablet 0     No current facility-administered medications for this visit. OBJECTIVE:    There were no vitals taken for this visit. Physical Exam  Constitutional:       General: She is not in acute distress. Neurological:      Mental Status: She is oriented to person, place, and time. Cranial Nerves: No cranial nerve deficit. Psychiatric:         Mood and Affect: Mood normal.         ASSESSMENT:  1. Type 2 diabetes mellitus with hyperglycemia, without long-term current use of insulin ([facility] [address]    2. Essential hypertension    3. Hypothyroidism, unspecified type    4. Hyperlipidemia, unspecified hyperlipidemia type        PLAN:  Orders Placed This Encounter   Procedures    Hemoglobin A1C    BASIC METABOLIC PANEL    Lipid Panel     Labs reviewed. Obtain new labs before next visit  Continue medications. She will reduce Glyburide to 1 bid  ADR's explained  The patient is asked to make an attempt to improve diet and exercise patterns to aid in medical management of this problem. Pt to make appt with GYN for pap       He/She and or healthcare decision maker is aware that a bill may be received for this telephone service, depending on insurance coverage. Verbal consent has been given to proceed: Yes    I affirm this is a Patient Initiated Episode with a patient who has not had a related appointment within my department in the past 7 days or scheduled within the next 24 hours    Patient identification was verified at the start of the visit: Yes    Total time:21 minutes    Return in about 4 months (around 10/8/2020) for Diabetes.     Electronically Signed by Cecily Loredo DO

## 2020-06-09 PROBLEM — I10 ESSENTIAL HYPERTENSION: Status: ACTIVE | Noted: 2020-06-09

## 2020-06-09 PROBLEM — E78.5 HYPERLIPIDEMIA: Status: ACTIVE | Noted: 2020-06-09

## 2020-06-09 PROBLEM — E11.65 TYPE 2 DIABETES MELLITUS WITH HYPERGLYCEMIA, WITHOUT LONG-TERM CURRENT USE OF INSULIN (HCC): Status: ACTIVE | Noted: 2020-06-09

## 2020-06-09 PROBLEM — E03.9 HYPOTHYROIDISM: Status: ACTIVE | Noted: 2020-06-09

## 2020-06-09 ASSESSMENT — ENCOUNTER SYMPTOMS
ABDOMINAL PAIN: 0
COUGH: 0
CONSTIPATION: 0
DIARRHEA: 0
SHORTNESS OF BREATH: 0
NAUSEA: 0

## 2020-06-10 RX ORDER — EMPAGLIFLOZIN 10 MG/1
TABLET, FILM COATED ORAL
Qty: 90 TABLET | Refills: 2 | Status: SHIPPED | OUTPATIENT
Start: 2020-06-10 | End: 2021-03-19

## 2020-06-10 RX ORDER — HYDROCHLOROTHIAZIDE 25 MG/1
TABLET ORAL
Qty: 180 TABLET | Refills: 2 | Status: SHIPPED | OUTPATIENT
Start: 2020-06-10 | End: 2021-06-28

## 2020-06-10 RX ORDER — PRAVASTATIN SODIUM 40 MG
TABLET ORAL
Qty: 90 TABLET | Refills: 2 | Status: SHIPPED | OUTPATIENT
Start: 2020-06-10 | End: 2021-03-01

## 2020-07-06 RX ORDER — AMLODIPINE BESYLATE 5 MG/1
TABLET ORAL
Qty: 90 TABLET | Refills: 1 | Status: SHIPPED | OUTPATIENT
Start: 2020-07-06 | End: 2021-03-01

## 2020-08-03 RX ORDER — LOSARTAN POTASSIUM 100 MG/1
TABLET ORAL
Qty: 90 TABLET | Refills: 2 | Status: SHIPPED | OUTPATIENT
Start: 2020-08-03 | End: 2021-04-05

## 2020-08-19 PROBLEM — R22.2 FULLNESS OF SUPRACLAVICULAR FOSSA: Status: ACTIVE | Noted: 2020-08-19

## 2020-08-24 RX ORDER — LEVOTHYROXINE SODIUM 175 UG/1
TABLET ORAL
Qty: 90 TABLET | Refills: 1 | Status: SHIPPED | OUTPATIENT
Start: 2020-08-24 | End: 2021-03-19

## 2020-08-24 RX ORDER — PAROXETINE 10 MG/1
TABLET, FILM COATED ORAL
Qty: 90 TABLET | Refills: 1 | Status: SHIPPED | OUTPATIENT
Start: 2020-08-24 | End: 2021-03-01

## 2020-10-05 RX ORDER — EXENATIDE 2 MG/.85ML
INJECTION, SUSPENSION, EXTENDED RELEASE SUBCUTANEOUS
Qty: 10.2 ML | Refills: 3 | Status: SHIPPED | OUTPATIENT
Start: 2020-10-05 | End: 2021-09-15

## 2020-10-20 RX ORDER — GLYBURIDE 5 MG/1
TABLET ORAL
Qty: 270 TABLET | Refills: 3 | Status: SHIPPED | OUTPATIENT
Start: 2020-10-20 | End: 2021-10-01 | Stop reason: SDUPTHER

## 2020-10-30 ENCOUNTER — OFFICE VISIT (OUTPATIENT)
Dept: INTERNAL MEDICINE CLINIC | Age: 60
End: 2020-10-30
Payer: COMMERCIAL

## 2020-10-30 VITALS
DIASTOLIC BLOOD PRESSURE: 90 MMHG | OXYGEN SATURATION: 98 % | TEMPERATURE: 97 F | WEIGHT: 293 LBS | BODY MASS INDEX: 54.21 KG/M2 | SYSTOLIC BLOOD PRESSURE: 124 MMHG | HEART RATE: 90 BPM

## 2020-10-30 PROCEDURE — 99214 OFFICE O/P EST MOD 30 MIN: CPT | Performed by: FAMILY MEDICINE

## 2020-10-30 PROCEDURE — 3051F HG A1C>EQUAL 7.0%<8.0%: CPT | Performed by: FAMILY MEDICINE

## 2020-10-30 RX ORDER — AZITHROMYCIN 250 MG/1
250 TABLET, FILM COATED ORAL SEE ADMIN INSTRUCTIONS
Qty: 6 TABLET | Refills: 0 | Status: SHIPPED | OUTPATIENT
Start: 2020-10-30 | End: 2020-11-04

## 2020-10-30 ASSESSMENT — ENCOUNTER SYMPTOMS
NAUSEA: 0
CONSTIPATION: 0
ABDOMINAL PAIN: 0
SHORTNESS OF BREATH: 0
COUGH: 0
DIARRHEA: 0
BACK PAIN: 0

## 2020-10-30 NOTE — PROGRESS NOTES
Wade Plunkett  1960  62 y.o.  female    Chief Complaint   Patient presents with    3 Month Follow-Up         History of Present Illness  Wade Plunkett is a 62 y.o. female who presents today for a check up.   -Sinus congestion, drainage and pressure. No fever, cough or Sob. Symptoms > 1 week. Nothing has helped  -DM II- Hba1c 7.6 was 6.9, She tried to reduce the Glyburide to 1 bid, but has had to increase again due to elevated BS  -HLD- LDL 88, HDL 47, Tg 157  -HTN- Sl up today, but normally better on her medications    Review of Systems   Constitutional: Negative for diaphoresis and fever. HENT: Positive for congestion, rhinorrhea and sinus pressure. Negative for sore throat. Respiratory: Negative for cough and shortness of breath. Cardiovascular: Negative for chest pain and palpitations. Gastrointestinal: Negative for abdominal pain, constipation, diarrhea and nausea. Genitourinary: Negative for difficulty urinating. Musculoskeletal: Negative for back pain and neck pain. Neurological: Negative for dizziness and headaches. Psychiatric/Behavioral: Negative for dysphoric mood. No Known Allergies    Past Medical History:   Diagnosis Date    Allergic rhinitis     Anemia     Depressive disorder     Diabetes mellitus ([address]     Hyperlipidemia     Hypertension     Hypothyroid     Morbid obesity ([address]     Osteoarthrosis     Tricompartmental arthritis of the left knee    Radiculopathy, cervical region     Sensorimotor neuropathy 2014    low grade of Upper Extremities    Stricture and stenosis of cervix uteri        Past Surgical History:   Procedure Laterality Date    BACK SURGERY       SECTION      ENDOMETRIAL ABLATION         History reviewed. No pertinent family history.     Social History     Tobacco Use    Smoking status: Never Smoker    Smokeless tobacco: Never Used   Substance Use Topics    Alcohol use: No    Drug use: No       Current Outpatient Medications and oriented to person, place, and time. Cranial Nerves: No cranial nerve deficit. Comments: Diabetic foot exam-Monofilament testing normal, No ulcerations. +Pedal pulses. Skin slightly dry   Psychiatric:         Mood and Affect: Mood normal.         ASSESSMENT:  1. Type 2 diabetes mellitus with hyperglycemia, without long-term current use of insulin ([facility] [address]    2. Essential hypertension    3. Hypothyroidism, unspecified type    4. Hyperlipidemia, unspecified hyperlipidemia type    5. Acute non-recurrent maxillary sinusitis        PLAN:    Orders Placed This Encounter   Procedures    Hemoglobin A1C    TSH without Reflex    T4, Free    Diabetic Foot Exam       Orders Placed This Encounter   Medications    azithromycin (ZITHROMAX) 250 MG tablet     Sig: Take 1 tablet by mouth See Admin Instructions for 5 days 500mg on day 1 followed by 250mg on days 2 - 5     Dispense:  6 tablet     Refill:  0   Obtain [facility] report  Obtain pap from [facility]. [facility]  Continue medications  ADR's explained  Monitor BS  The patient is asked to make an attempt to improve diet and exercise patterns to aid in medical management of this problem. Persist RTO or call             Return in about 4 months (around 2/28/2021) for Diabetes.     Electronically Signed by Elizabeth Slater DO

## 2020-11-07 ASSESSMENT — ENCOUNTER SYMPTOMS
SORE THROAT: 0
RHINORRHEA: 1
SINUS PRESSURE: 1

## 2020-11-13 ENCOUNTER — TELEPHONE (OUTPATIENT)
Dept: INTERNAL MEDICINE CLINIC | Age: 60
End: 2020-11-13

## 2020-12-07 ENCOUNTER — TELEPHONE (OUTPATIENT)
Dept: INTERNAL MEDICINE CLINIC | Age: 60
End: 2020-12-07

## 2020-12-07 ENCOUNTER — E-VISIT (OUTPATIENT)
Dept: INTERNAL MEDICINE CLINIC | Age: 60
End: 2020-12-07
Payer: COMMERCIAL

## 2020-12-07 PROCEDURE — 99421 OL DIG E/M SVC 5-10 MIN: CPT | Performed by: FAMILY MEDICINE

## 2020-12-07 RX ORDER — AMOXICILLIN AND CLAVULANATE POTASSIUM 875; 125 MG/1; MG/1
1 TABLET, FILM COATED ORAL 2 TIMES DAILY
Qty: 20 TABLET | Refills: 0 | Status: SHIPPED | OUTPATIENT
Start: 2020-12-07 | End: 2020-12-17

## 2020-12-07 NOTE — PROGRESS NOTES
CC: Sinus symptoms  HPI: This 63 yo f with c/o of sinus pressure, thick yellow drainage and persistent symptoms. Treated  on 10/30-- and improved, but then came back. Hx of recent +COVID infection on 11/11. PE: Unable  A: Sinusitis  P: Augmentin 875, 1 po bid, 20, 0rf. ADR's explained. Pt denies problems with this med. Spoke to pt. If persist she will follow up    5-10 minutes were spent on the digital evaluation and management of this patient.

## 2020-12-07 NOTE — TELEPHONE ENCOUNTER
She can do an Evisit, or go to the walk in -will need to make appt first. It has been 2 months--I prefer that she is examined  And that would be the walk in.    Confirm  If she was tested for COVID or had COVID 19)

## 2021-03-01 RX ORDER — AMLODIPINE BESYLATE 5 MG/1
TABLET ORAL
Qty: 90 TABLET | Refills: 1 | Status: SHIPPED | OUTPATIENT
Start: 2021-03-01 | End: 2021-09-02

## 2021-03-01 RX ORDER — PAROXETINE 10 MG/1
TABLET, FILM COATED ORAL
Qty: 90 TABLET | Refills: 1 | Status: SHIPPED | OUTPATIENT
Start: 2021-03-01 | End: 2021-09-02

## 2021-03-01 RX ORDER — PRAVASTATIN SODIUM 40 MG
TABLET ORAL
Qty: 90 TABLET | Refills: 1 | Status: SHIPPED | OUTPATIENT
Start: 2021-03-01 | End: 2021-09-02

## 2021-03-03 ENCOUNTER — OFFICE VISIT (OUTPATIENT)
Dept: INTERNAL MEDICINE CLINIC | Age: 61
End: 2021-03-03
Payer: COMMERCIAL

## 2021-03-03 VITALS
TEMPERATURE: 97.1 F | HEART RATE: 85 BPM | OXYGEN SATURATION: 98 % | BODY MASS INDEX: 51.91 KG/M2 | HEIGHT: 63 IN | DIASTOLIC BLOOD PRESSURE: 88 MMHG | WEIGHT: 293 LBS | SYSTOLIC BLOOD PRESSURE: 128 MMHG

## 2021-03-03 DIAGNOSIS — Z79.899 LONG TERM USE OF DRUG: ICD-10-CM

## 2021-03-03 DIAGNOSIS — E78.5 HYPERLIPIDEMIA, UNSPECIFIED HYPERLIPIDEMIA TYPE: ICD-10-CM

## 2021-03-03 DIAGNOSIS — I10 ESSENTIAL HYPERTENSION: ICD-10-CM

## 2021-03-03 DIAGNOSIS — E03.9 HYPOTHYROIDISM, UNSPECIFIED TYPE: ICD-10-CM

## 2021-03-03 DIAGNOSIS — E11.65 TYPE 2 DIABETES MELLITUS WITH HYPERGLYCEMIA, WITHOUT LONG-TERM CURRENT USE OF INSULIN (HCC): Primary | ICD-10-CM

## 2021-03-03 PROCEDURE — 99214 OFFICE O/P EST MOD 30 MIN: CPT | Performed by: FAMILY MEDICINE

## 2021-03-03 PROCEDURE — 3051F HG A1C>EQUAL 7.0%<8.0%: CPT | Performed by: FAMILY MEDICINE

## 2021-03-03 RX ORDER — FLUTICASONE PROPIONATE 50 MCG
1 SPRAY, SUSPENSION (ML) NASAL 2 TIMES DAILY
COMMUNITY
Start: 2021-02-02

## 2021-03-03 RX ORDER — MONTELUKAST SODIUM 10 MG/1
10 TABLET ORAL DAILY
COMMUNITY
Start: 2021-01-29

## 2021-03-03 RX ORDER — AZELASTINE 1 MG/ML
1 SPRAY, METERED NASAL 2 TIMES DAILY
COMMUNITY
Start: 2021-01-14

## 2021-03-03 ASSESSMENT — ENCOUNTER SYMPTOMS
BACK PAIN: 0
ABDOMINAL PAIN: 0
COUGH: 0
SHORTNESS OF BREATH: 0
CONSTIPATION: 0
DIARRHEA: 0
CHEST TIGHTNESS: 0
NAUSEA: 0

## 2021-03-03 ASSESSMENT — PATIENT HEALTH QUESTIONNAIRE - PHQ9
SUM OF ALL RESPONSES TO PHQ QUESTIONS 1-9: 0
SUM OF ALL RESPONSES TO PHQ QUESTIONS 1-9: 0
2. FEELING DOWN, DEPRESSED OR HOPELESS: 0

## 2021-03-03 NOTE — PROGRESS NOTES
Angeline Warren  1960  62 y.o.  female    Chief Complaint   Patient presents with    Diabetes         History of Present Illness  Angeline Warren is a 62 y.o. female who presents today for a check up. Patient Active Problem List    Diagnosis Date Noted    Chronic rhinitis     Fullness of supraclavicular fossa 2020    Type 2 diabetes mellitus with hyperglycemia, without long-term current use of insulin ([facility] [address] 2020    Essential hypertension 2020    Hypothyroidism 2020    Hyperlipidemia 2020     - DM II- Uncontrolled. Hba1c 7.6. On Bydureon, Glyburide, Jardiance and Metformin  - Hypothyroidism- Stable  - HTN- Stable  - Chronic rhinitis -Seen by Dr. Apolinar Ewing- ENT and she is on Astelin and Flonase. He states she is a candidate for sinus surgery. Pt declined at this time    Review of Systems   Constitutional: Negative for diaphoresis and fever. HENT: Negative. Respiratory: Negative for cough, chest tightness and shortness of breath. Cardiovascular: Negative for chest pain and palpitations. Gastrointestinal: Negative for abdominal pain, constipation, diarrhea and nausea. Genitourinary: Negative for difficulty urinating. Musculoskeletal: Negative for back pain. Neurological: Negative for dizziness and headaches. Psychiatric/Behavioral: Negative for dysphoric mood.        No Known Allergies    Past Medical History:   Diagnosis Date    Allergic rhinitis     Anemia     Chronic rhinitis     Depressive disorder     Diabetes mellitus ([address]     Hyperlipidemia     Hypertension     Hypothyroid     Morbid obesity ([address]     Osteoarthrosis     Tricompartmental arthritis of the left knee    Radiculopathy, cervical region     Sensorimotor neuropathy 2014    low grade of Upper Extremities    Stricture and stenosis of cervix uteri        Past Surgical History:   Procedure Laterality Date    BACK SURGERY       SECTION      ENDOMETRIAL ABLATION History reviewed. No pertinent family history. Social History     Tobacco Use    Smoking status: Never Smoker    Smokeless tobacco: Never Used   Substance Use Topics    Alcohol use: No    Drug use: No       Current Outpatient Medications   Medication Sig Dispense Refill    pravastatin (PRAVACHOL) 40 MG tablet TAKE 1 TABLET DAILY 90 tablet 1    amLODIPine (NORVASC) 5 MG tablet TAKE 1 TABLET DAILY 90 tablet 1    PARoxetine (PAXIL) 10 MG tablet TAKE 1 TABLET EVERY MORNING 90 tablet 1    metFORMIN (GLUCOPHAGE) 1000 MG tablet TAKE 1 TABLET TWICE A DAY WITH MEALS 180 tablet 1    glyBURIDE (DIABETA) 5 MG tablet TAKE 1 TABLET EVERY MORNING AND 2 TABLETS EVERY EVENING WITH MEALS 270 tablet 3    BYDUREON BCISE 2 MG/0.85ML AUIJ INJECT 2 MG UNDER THE SKIN ONCE A WEEK (DISCONTINUE TRULICITY) 99.3 mL 3    levothyroxine (SYNTHROID) 175 MCG tablet TAKE 1 TABLET DAILY 90 tablet 1    losartan (COZAAR) 100 MG tablet TAKE 1 TABLET DAILY 90 tablet 2    hydroCHLOROthiazide (HYDRODIURIL) 25 MG tablet TAKE 2 TABLETS DAILY 180 tablet 2    JARDIANCE 10 MG tablet TAKE 1 TABLET DAILY 90 tablet 2    montelukast (SINGULAIR) 10 MG tablet Take 10 mg by mouth daily      azelastine (ASTELIN) 0.1 % nasal spray 1 spray by Nasal route 2 times daily       fluticasone (FLONASE) 50 MCG/ACT nasal spray 1 spray by Nasal route 2 times daily       No current facility-administered medications for this visit. OBJECTIVE:    /88 (Site: Right Upper Arm, Position: Sitting, Cuff Size: Large Adult)   Pulse 85   Temp 97.1 °F (36.2 °C)   Ht 5' 3\" (1.6 m)   Wt (!) 307 lb (139.3 kg)   SpO2 98%   BMI 54.38 kg/m²     Physical Exam  Vitals signs reviewed. Constitutional:       General: She is not in acute distress. Eyes:      Conjunctiva/sclera: Conjunctivae normal.   Neck:      Musculoskeletal: Neck supple. Cardiovascular:      Rate and Rhythm: Normal rate and regular rhythm.    Pulmonary: Effort: Pulmonary effort is normal. No respiratory distress. Breath sounds: Normal breath sounds. Abdominal:      General: Bowel sounds are normal.      Palpations: Abdomen is soft. Tenderness: There is no abdominal tenderness. Musculoskeletal:      Right lower leg: No edema. Left lower leg: No edema. Neurological:      Mental Status: She is alert and oriented to person, place, and time. Cranial Nerves: No cranial nerve deficit. Psychiatric:         Mood and Affect: Mood normal.         ASSESSMENT:  1. Type 2 diabetes mellitus with hyperglycemia, without long-term current use of insulin ([address]    2. Essential hypertension    3. Hypothyroidism, unspecified type    4. Hyperlipidemia, unspecified hyperlipidemia type    5. Long term use of drug        PLAN:    Orders Placed This Encounter   Procedures    Hemoglobin A1C    Comprehensive Metabolic Panel    Microalbumin / Creatinine Urine Ratio    CBC Auto Differential   Continue medications. She does not want to change diabetic meds at this time  Obtain labs as directed  Obtain ENT notes  The patient is asked to make an attempt to improve diet and exercise patterns to aid in medical management of this problem. Keep f/u with specialists           Return in about 3 months (around 6/3/2021) for Diabetes.     Electronically Signed by Manuel Arrieta DO

## 2021-04-05 RX ORDER — LOSARTAN POTASSIUM 100 MG/1
TABLET ORAL
Qty: 90 TABLET | Refills: 1 | Status: SHIPPED | OUTPATIENT
Start: 2021-04-05 | End: 2021-10-01 | Stop reason: SDUPTHER

## 2021-05-24 DIAGNOSIS — E11.65 TYPE 2 DIABETES MELLITUS WITH HYPERGLYCEMIA, WITHOUT LONG-TERM CURRENT USE OF INSULIN (HCC): ICD-10-CM

## 2021-05-24 RX ORDER — EMPAGLIFLOZIN 10 MG/1
10 TABLET, FILM COATED ORAL DAILY
Qty: 90 TABLET | Refills: 1 | Status: SHIPPED | OUTPATIENT
Start: 2021-05-24 | End: 2022-03-22

## 2021-05-29 LAB — DIABETIC RETINOPATHY: NEGATIVE

## 2021-06-03 ENCOUNTER — OFFICE VISIT (OUTPATIENT)
Dept: INTERNAL MEDICINE CLINIC | Age: 61
End: 2021-06-03
Payer: COMMERCIAL

## 2021-06-03 VITALS
WEIGHT: 293 LBS | SYSTOLIC BLOOD PRESSURE: 130 MMHG | BODY MASS INDEX: 55.16 KG/M2 | TEMPERATURE: 97.3 F | OXYGEN SATURATION: 98 % | DIASTOLIC BLOOD PRESSURE: 80 MMHG | HEART RATE: 84 BPM

## 2021-06-03 DIAGNOSIS — E78.5 HYPERLIPIDEMIA, UNSPECIFIED HYPERLIPIDEMIA TYPE: ICD-10-CM

## 2021-06-03 DIAGNOSIS — I10 ESSENTIAL HYPERTENSION: ICD-10-CM

## 2021-06-03 DIAGNOSIS — E03.9 HYPOTHYROIDISM, UNSPECIFIED TYPE: ICD-10-CM

## 2021-06-03 DIAGNOSIS — E11.65 TYPE 2 DIABETES MELLITUS WITH HYPERGLYCEMIA, WITHOUT LONG-TERM CURRENT USE OF INSULIN (HCC): Primary | ICD-10-CM

## 2021-06-03 PROCEDURE — 99214 OFFICE O/P EST MOD 30 MIN: CPT | Performed by: FAMILY MEDICINE

## 2021-06-03 PROCEDURE — 3051F HG A1C>EQUAL 7.0%<8.0%: CPT | Performed by: FAMILY MEDICINE

## 2021-06-03 RX ORDER — ASPIRIN 81 MG/1
81 TABLET ORAL NIGHTLY
COMMUNITY

## 2021-06-03 SDOH — ECONOMIC STABILITY: FOOD INSECURITY: WITHIN THE PAST 12 MONTHS, YOU WORRIED THAT YOUR FOOD WOULD RUN OUT BEFORE YOU GOT MONEY TO BUY MORE.: NEVER TRUE

## 2021-06-03 SDOH — ECONOMIC STABILITY: TRANSPORTATION INSECURITY
IN THE PAST 12 MONTHS, HAS THE LACK OF TRANSPORTATION KEPT YOU FROM MEDICAL APPOINTMENTS OR FROM GETTING MEDICATIONS?: NO

## 2021-06-03 SDOH — ECONOMIC STABILITY: FOOD INSECURITY: WITHIN THE PAST 12 MONTHS, THE FOOD YOU BOUGHT JUST DIDN'T LAST AND YOU DIDN'T HAVE MONEY TO GET MORE.: NEVER TRUE

## 2021-06-03 SDOH — ECONOMIC STABILITY: TRANSPORTATION INSECURITY
IN THE PAST 12 MONTHS, HAS LACK OF TRANSPORTATION KEPT YOU FROM MEETINGS, WORK, OR FROM GETTING THINGS NEEDED FOR DAILY LIVING?: NO

## 2021-06-03 ASSESSMENT — ENCOUNTER SYMPTOMS
SHORTNESS OF BREATH: 0
BACK PAIN: 0
NAUSEA: 0
COUGH: 0
ABDOMINAL PAIN: 0
CHEST TIGHTNESS: 0

## 2021-06-03 ASSESSMENT — SOCIAL DETERMINANTS OF HEALTH (SDOH): HOW HARD IS IT FOR YOU TO PAY FOR THE VERY BASICS LIKE FOOD, HOUSING, MEDICAL CARE, AND HEATING?: NOT HARD AT ALL

## 2021-06-03 NOTE — PROGRESS NOTES
Win Ceja  1960  62 y.o.  female    Chief Complaint   Patient presents with    3 Month Follow-Up    Diabetes         History of Present Illness  Win Ceja is a 62 y.o. female who presents today for a check up. Patient Active Problem List    Diagnosis Date Noted    Chronic rhinitis     Fullness of supraclavicular fossa 2020    Type 2 diabetes mellitus with hyperglycemia, without long-term current use of insulin ([address] 2020    Essential hypertension 2020    Hypothyroidism 2020    Hyperlipidemia 2020     -DM II- Uncontrolled. Hba1c 7.9 was 7.6. Pt stated she tried intermittent fasting and she gained weight  -HTN, HLD, and hypothyroidism- Stable    Review of Systems   Constitutional: Negative for diaphoresis and fever. HENT: Negative. Respiratory: Negative for cough, chest tightness and shortness of breath. Cardiovascular: Negative for chest pain and palpitations. Gastrointestinal: Negative for abdominal pain and nausea. Genitourinary: Negative for difficulty urinating. Musculoskeletal: Negative for back pain. Neurological: Negative for dizziness and headaches. Psychiatric/Behavioral: Negative for dysphoric mood. No Known Allergies    Past Medical History:   Diagnosis Date    Allergic rhinitis     Anemia     Chronic rhinitis     Depressive disorder     Diabetes mellitus ([address]     History of COVID-19     Hyperlipidemia     Hypertension     Hypothyroid     Morbid obesity ([address]     Osteoarthrosis     Tricompartmental arthritis of the left knee    Radiculopathy, cervical region     Sensorimotor neuropathy 2014    low grade of Upper Extremities    Stricture and stenosis of cervix uteri        Past Surgical History:   Procedure Laterality Date    BACK SURGERY       SECTION      ENDOMETRIAL ABLATION         History reviewed. No pertinent family history.     Social History     Tobacco Use    Smoking status: Never Smoker    Smokeless tobacco: Never Used   Vaping Use    Vaping Use: Never used   Substance Use Topics    Alcohol use: No    Drug use: No       Current Outpatient Medications   Medication Sig Dispense Refill    aspirin 81 MG EC tablet Take 81 mg by mouth nightly      metFORMIN (GLUCOPHAGE) 1000 MG tablet TAKE 1 TABLET TWICE A DAY WITH MEALS 180 tablet 1    empagliflozin (JARDIANCE) 10 MG tablet Take 1 tablet by mouth daily 90 tablet 1    losartan (COZAAR) 100 MG tablet TAKE 1 TABLET DAILY 90 tablet 1    levothyroxine (SYNTHROID) 175 MCG tablet TAKE 1 TABLET DAILY 90 tablet 3    montelukast (SINGULAIR) 10 MG tablet Take 10 mg by mouth daily      azelastine (ASTELIN) 0.1 % nasal spray 1 spray by Nasal route 2 times daily       fluticasone (FLONASE) 50 MCG/ACT nasal spray 1 spray by Nasal route 2 times daily      pravastatin (PRAVACHOL) 40 MG tablet TAKE 1 TABLET DAILY 90 tablet 1    amLODIPine (NORVASC) 5 MG tablet TAKE 1 TABLET DAILY 90 tablet 1    PARoxetine (PAXIL) 10 MG tablet TAKE 1 TABLET EVERY MORNING 90 tablet 1    glyBURIDE (DIABETA) 5 MG tablet TAKE 1 TABLET EVERY MORNING AND 2 TABLETS EVERY EVENING WITH MEALS 270 tablet 3    BYDUREON BCISE 2 MG/0.85ML AUIJ INJECT 2 MG UNDER THE SKIN ONCE A WEEK (DISCONTINUE TRULICITY) 32.2 mL 3    hydroCHLOROthiazide (HYDRODIURIL) 25 MG tablet TAKE 2 TABLETS DAILY 180 tablet 2     No current facility-administered medications for this visit. OBJECTIVE:    /80   Pulse 84   Temp 97.3 °F (36.3 °C) (Temporal)   Wt (!) 311 lb 6.4 oz (141.3 kg)   SpO2 98%   Breastfeeding No   BMI 55.16 kg/m²     Physical Exam  Vitals reviewed. Constitutional:       General: She is not in acute distress. Eyes:      Conjunctiva/sclera: Conjunctivae normal.   Cardiovascular:      Rate and Rhythm: Normal rate and regular rhythm. Pulmonary:      Effort: Pulmonary effort is normal. No respiratory distress. Breath sounds: Normal breath sounds.    Abdominal:      General: Bowel sounds are normal.      Palpations: Abdomen is soft. Tenderness: There is no abdominal tenderness. Musculoskeletal:      Cervical back: Neck supple. Right lower leg: No edema. Left lower leg: No edema. Neurological:      Mental Status: She is alert and oriented to person, place, and time. Cranial Nerves: No cranial nerve deficit. Psychiatric:         Mood and Affect: Mood normal.         ASSESSMENT:  1. Type 2 diabetes mellitus with hyperglycemia, without long-term current use of insulin ([address]    2. Essential hypertension    3. Hyperlipidemia, unspecified hyperlipidemia type    4. Hypothyroidism, unspecified type        PLAN:    Orders Placed This Encounter   Procedures    Hemoglobin A1C    Lipid Panel    CBC Auto Differential   Obtain lab  Last labs reviewed 5/2021  Continue medications  The patient is asked to make an attempt to improve diet and exercise patterns   Will get mammogram 6/18  Keep f/u with Gyn       Return in about 4 months (around 10/3/2021) for Diabetes.     Electronically Signed by Joey Gee DO

## 2021-06-18 ENCOUNTER — HOSPITAL ENCOUNTER (OUTPATIENT)
Dept: WOMENS IMAGING | Age: 61
Discharge: HOME | End: 2021-06-18
Payer: COMMERCIAL

## 2021-06-18 DIAGNOSIS — Z12.31 VISIT FOR SCREENING MAMMOGRAM: ICD-10-CM

## 2021-06-18 PROCEDURE — 77067 SCR MAMMO BI INCL CAD: CPT

## 2021-06-28 RX ORDER — HYDROCHLOROTHIAZIDE 25 MG/1
TABLET ORAL
Qty: 180 TABLET | Refills: 1 | Status: SHIPPED | OUTPATIENT
Start: 2021-06-28 | End: 2022-08-02

## 2021-09-02 RX ORDER — PAROXETINE 10 MG/1
TABLET, FILM COATED ORAL
Qty: 90 TABLET | Refills: 1 | Status: SHIPPED | OUTPATIENT
Start: 2021-09-02 | End: 2022-02-14

## 2021-09-02 RX ORDER — PRAVASTATIN SODIUM 40 MG
TABLET ORAL
Qty: 90 TABLET | Refills: 1 | Status: SHIPPED | OUTPATIENT
Start: 2021-09-02 | End: 2022-03-22

## 2021-09-02 RX ORDER — AMLODIPINE BESYLATE 5 MG/1
TABLET ORAL
Qty: 90 TABLET | Refills: 1 | Status: SHIPPED | OUTPATIENT
Start: 2021-09-02 | End: 2022-03-22

## 2021-09-16 RX ORDER — EXENATIDE 2 MG/.85ML
INJECTION, SUSPENSION, EXTENDED RELEASE SUBCUTANEOUS
Qty: 10.2 ML | Refills: 1 | Status: SHIPPED | OUTPATIENT
Start: 2021-09-16 | End: 2021-10-01

## 2021-10-01 ENCOUNTER — OFFICE VISIT (OUTPATIENT)
Dept: INTERNAL MEDICINE CLINIC | Age: 61
End: 2021-10-01
Payer: COMMERCIAL

## 2021-10-01 VITALS
SYSTOLIC BLOOD PRESSURE: 130 MMHG | WEIGHT: 293 LBS | TEMPERATURE: 96.8 F | BODY MASS INDEX: 51.91 KG/M2 | OXYGEN SATURATION: 98 % | HEIGHT: 63 IN | DIASTOLIC BLOOD PRESSURE: 89 MMHG | HEART RATE: 87 BPM

## 2021-10-01 DIAGNOSIS — D75.1 ERYTHROCYTOSIS: ICD-10-CM

## 2021-10-01 DIAGNOSIS — E11.65 TYPE 2 DIABETES MELLITUS WITH HYPERGLYCEMIA, WITHOUT LONG-TERM CURRENT USE OF INSULIN (HCC): Primary | ICD-10-CM

## 2021-10-01 DIAGNOSIS — E78.5 HYPERLIPIDEMIA, UNSPECIFIED HYPERLIPIDEMIA TYPE: ICD-10-CM

## 2021-10-01 DIAGNOSIS — I10 ESSENTIAL HYPERTENSION: ICD-10-CM

## 2021-10-01 DIAGNOSIS — E03.9 HYPOTHYROIDISM, UNSPECIFIED TYPE: ICD-10-CM

## 2021-10-01 PROCEDURE — 3052F HG A1C>EQUAL 8.0%<EQUAL 9.0%: CPT | Performed by: FAMILY MEDICINE

## 2021-10-01 PROCEDURE — 99214 OFFICE O/P EST MOD 30 MIN: CPT | Performed by: FAMILY MEDICINE

## 2021-10-01 RX ORDER — LEVOTHYROXINE SODIUM 175 UG/1
TABLET ORAL
Qty: 90 TABLET | Refills: 1 | Status: SHIPPED | OUTPATIENT
Start: 2021-10-01 | End: 2022-06-02

## 2021-10-01 RX ORDER — GLYBURIDE 5 MG/1
TABLET ORAL
Qty: 270 TABLET | Refills: 1 | Status: SHIPPED | OUTPATIENT
Start: 2021-10-01 | End: 2022-03-22

## 2021-10-01 RX ORDER — LOSARTAN POTASSIUM 100 MG/1
TABLET ORAL
Qty: 90 TABLET | Refills: 1 | Status: SHIPPED | OUTPATIENT
Start: 2021-10-01 | End: 2022-03-22

## 2021-10-01 RX ORDER — ORAL SEMAGLUTIDE 3 MG/1
TABLET ORAL
Qty: 30 TABLET | Refills: 0 | Status: SHIPPED | OUTPATIENT
Start: 2021-10-01 | End: 2021-10-29

## 2021-10-01 ASSESSMENT — ENCOUNTER SYMPTOMS
CONSTIPATION: 0
DIARRHEA: 0
COUGH: 0
SHORTNESS OF BREATH: 0
NAUSEA: 0
BACK PAIN: 0
ABDOMINAL PAIN: 0

## 2021-10-01 NOTE — PROGRESS NOTES
Emma Huffman (:  1960) is a 62 y.o. female,Established patient, here for evaluation of the following chief complaint(s):  Follow-up (4 months), Diabetes, and Results (lab)         ASSESSMENT/PLAN:  1. Type 2 diabetes mellitus with hyperglycemia, without long-term current use of insulin (HCC)  -D/C Bydureon and start Rybelsus  -     glyBURIDE (DIABETA) 5 MG tablet; TAKE 1 TABLET EVERY MORNING AND 2 TABLETS EVERY EVENING WITH MEALS, Disp-270 tablet, R-1Normal  -     Semaglutide (RYBELSUS) 3 MG TABS; Take one tablet by mouth daily, Disp-30 tablet, R-0Normal  Continue other medications  The patient is asked to make an attempt to improve diet and exercise patterns   2. Essential hypertension  -     losartan (COZAAR) 100 MG tablet; TAKE 1 TABLET DAILY, Disp-90 tablet, R-1Normal  3. Hyperlipidemia, unspecified hyperlipidemia type  Continue medications  4. Hypothyroidism, unspecified type  -     levothyroxine (SYNTHROID) 175 MCG tablet; TAKE 1 TABLET DAILY, Disp-90 tablet, R-1Normal  5. Erythrocytosis  -     CBC Auto Differential; Future  Hx of Covid 19 in the past. Declines vaccination  On this date 10/1/2021 I have spent 30 minutes reviewing previous notes, test results and face to face with the patient discussing the diagnosis and importance of compliance with the treatment plan as well as documenting on the day of the visit.     Return in about 4 months (around 2022) for annual exam.      Lab Results   Component Value Date    LABA1C 8.3 (H) 2021     Lab Results   Component Value Date     01/10/2013     Lab Results   Component Value Date     2021    K 4.2 2021     2021    CO2 23 2021    BUN 20 2021    CREATININE 0.75 2021    GLUCOSE 178 (H) 2021    CALCIUM 10.1 2021    PROT 7.6 2021    LABALBU 4.4 2021    BILITOT 0.8 2021    ALKPHOS 73 2021    AST 26 2021    ALT 33 (H) 2021    LABGLOM 87 2021 GFRAA 100 05/22/2021    AGRATIO 1.4 05/22/2021    GLOB 3.2 05/22/2021       Lab Results   Component Value Date    WBC 6.4 09/25/2021    HGB 17.3 (H) 09/25/2021    HCT 50.4 (H) 09/25/2021    MCV 88 09/25/2021     09/25/2021     Lab Results   Component Value Date    CHOL 171 09/25/2021    CHOL 162 10/28/2020    CHOL 143 07/08/2019     Lab Results   Component Value Date    TRIG 177 (H) 09/25/2021    TRIG 157 (H) 10/28/2020    TRIG 112 07/08/2019     Lab Results   Component Value Date    HDL 44 09/25/2021    HDL 47 10/28/2020    HDL 47 07/08/2019     Lab Results   Component Value Date    LDLCALC 96 09/25/2021    LDLCALC 88 10/28/2020    LDLCALC 74 07/08/2019     Lab Results   Component Value Date    LABVLDL 31 09/25/2021    LABVLDL 27 10/28/2020    VLDL 22 07/08/2019    VLDL 37 04/06/2019         Subjective   SUBJECTIVE/OBJECTIVE:  HPI: This  61 yo f here for  Patient Active Problem List    Diagnosis Date Noted    Chronic rhinitis     Fullness of supraclavicular fossa 08/19/2020    Type 2 diabetes mellitus with hyperglycemia, without long-term current use of insulin ([facility] [address] 06/09/2020    Essential hypertension 06/09/2020    Hypothyroidism 06/09/2020    Hyperlipidemia 06/09/2020     DM II- Hba1c has increased to 8.3. Pt would like to change the Bydureon  Injection. She is getting tired of the injections  -HTN, and Hypothyroidism  -stable  -HLD- Pt trying to change diet. On Pravastatin  -Elevated hemoglobin on labs    Review of Systems   Constitutional: Negative for diaphoresis and fever. Respiratory: Negative for cough and shortness of breath. Cardiovascular: Negative for chest pain and palpitations. Gastrointestinal: Negative for abdominal pain, constipation, diarrhea and nausea. Genitourinary: Negative for difficulty urinating. Musculoskeletal: Negative for back pain. Neurological: Negative for dizziness and headaches. Psychiatric/Behavioral: Negative for dysphoric mood.        No Known Allergies  Current Outpatient Medications   Medication Sig Dispense Refill    losartan (COZAAR) 100 MG tablet TAKE 1 TABLET DAILY 90 tablet 1    levothyroxine (SYNTHROID) 175 MCG tablet TAKE 1 TABLET DAILY 90 tablet 1    glyBURIDE (DIABETA) 5 MG tablet TAKE 1 TABLET EVERY MORNING AND 2 TABLETS EVERY EVENING WITH MEALS 270 tablet 1    Semaglutide (RYBELSUS) 3 MG TABS Take one tablet by mouth daily 30 tablet 0    amLODIPine (NORVASC) 5 MG tablet TAKE 1 TABLET DAILY 90 tablet 1    pravastatin (PRAVACHOL) 40 MG tablet TAKE 1 TABLET DAILY 90 tablet 1    PARoxetine (PAXIL) 10 MG tablet TAKE 1 TABLET EVERY MORNING 90 tablet 1    hydroCHLOROthiazide (HYDRODIURIL) 25 MG tablet TAKE 2 TABLETS DAILY 180 tablet 1    aspirin 81 MG EC tablet Take 81 mg by mouth nightly      metFORMIN (GLUCOPHAGE) 1000 MG tablet TAKE 1 TABLET TWICE A DAY WITH MEALS 180 tablet 1    empagliflozin (JARDIANCE) 10 MG tablet Take 1 tablet by mouth daily 90 tablet 1    montelukast (SINGULAIR) 10 MG tablet Take 10 mg by mouth daily      azelastine (ASTELIN) 0.1 % nasal spray 1 spray by Nasal route 2 times daily       fluticasone (FLONASE) 50 MCG/ACT nasal spray 1 spray by Nasal route 2 times daily       No current facility-administered medications for this visit. Vitals:    10/01/21 0830 10/01/21 0912   BP: (!) 138/90 130/89   Pulse: 87    Temp: 96.8 °F (36 °C)    SpO2: 98%    Weight: (!) 312 lb 6.4 oz (141.7 kg)    Height: 5' 3\" (1.6 m)      Objective   Physical Exam  Vitals reviewed. Constitutional:       General: She is not in acute distress. Eyes:      Extraocular Movements: Extraocular movements intact. Conjunctiva/sclera: Conjunctivae normal.   Cardiovascular:      Rate and Rhythm: Normal rate and regular rhythm. Pulmonary:      Effort: Pulmonary effort is normal. No respiratory distress. Breath sounds: Normal breath sounds. Abdominal:      General: Bowel sounds are normal.      Palpations: Abdomen is soft. Tenderness: There is no abdominal tenderness. Musculoskeletal:      Cervical back: Neck supple. Right lower leg: No edema. Left lower leg: No edema. Neurological:      Mental Status: She is alert and oriented to person, place, and time. Psychiatric:         Mood and Affect: Mood normal.                An electronic signature was used to authenticate this note.     --Pooja Maya DO

## 2021-10-28 DIAGNOSIS — E11.65 TYPE 2 DIABETES MELLITUS WITH HYPERGLYCEMIA, WITHOUT LONG-TERM CURRENT USE OF INSULIN (HCC): ICD-10-CM

## 2021-10-28 RX ORDER — ORAL SEMAGLUTIDE 3 MG/1
TABLET ORAL
Qty: 90 TABLET | Refills: 1 | OUTPATIENT
Start: 2021-10-28

## 2021-10-28 NOTE — TELEPHONE ENCOUNTER
This pt will only need to be on the Rybelsus 3 mg for 1 month, and then she will need to go up to 7mg . Did she want 7 mg now?

## 2022-02-14 RX ORDER — PAROXETINE 10 MG/1
TABLET, FILM COATED ORAL
Qty: 90 TABLET | Refills: 1 | Status: SHIPPED | OUTPATIENT
Start: 2022-02-14 | End: 2022-08-18

## 2022-02-15 ENCOUNTER — OFFICE VISIT (OUTPATIENT)
Dept: INTERNAL MEDICINE CLINIC | Age: 62
End: 2022-02-15
Payer: COMMERCIAL

## 2022-02-15 VITALS
OXYGEN SATURATION: 97 % | WEIGHT: 293 LBS | HEIGHT: 63 IN | SYSTOLIC BLOOD PRESSURE: 128 MMHG | BODY MASS INDEX: 51.91 KG/M2 | HEART RATE: 94 BPM | DIASTOLIC BLOOD PRESSURE: 70 MMHG | TEMPERATURE: 96.8 F

## 2022-02-15 DIAGNOSIS — Z12.11 SCREENING FOR COLON CANCER: ICD-10-CM

## 2022-02-15 DIAGNOSIS — E78.5 HYPERLIPIDEMIA, UNSPECIFIED HYPERLIPIDEMIA TYPE: ICD-10-CM

## 2022-02-15 DIAGNOSIS — I10 ESSENTIAL HYPERTENSION: ICD-10-CM

## 2022-02-15 DIAGNOSIS — Z00.00 ANNUAL PHYSICAL EXAM: Primary | ICD-10-CM

## 2022-02-15 DIAGNOSIS — E11.65 TYPE 2 DIABETES MELLITUS WITH HYPERGLYCEMIA, WITHOUT LONG-TERM CURRENT USE OF INSULIN (HCC): ICD-10-CM

## 2022-02-15 DIAGNOSIS — E03.9 HYPOTHYROIDISM, UNSPECIFIED TYPE: ICD-10-CM

## 2022-02-15 PROCEDURE — 99396 PREV VISIT EST AGE 40-64: CPT | Performed by: FAMILY MEDICINE

## 2022-02-15 ASSESSMENT — ENCOUNTER SYMPTOMS
CONSTIPATION: 0
SHORTNESS OF BREATH: 0
BACK PAIN: 0
NAUSEA: 0
EYES NEGATIVE: 1
DIARRHEA: 0
BLOOD IN STOOL: 0
COUGH: 0
ABDOMINAL PAIN: 0

## 2022-02-15 NOTE — PROGRESS NOTES
Well Adult Note  Name: Josepha Bamberger Date: 2/15/2022   MRN: [id number] Sex: Female   Age: 62 y.o. Ethnicity: Non- / Non    : 1960 Race: White (non-)      Dea Glynn is here for well adult exam.  History:  Patient Active Problem List    Diagnosis Date Noted    Chronic rhinitis     Fullness of supraclavicular fossa 2020    Type 2 diabetes mellitus with hyperglycemia, without long-term current use of insulin ([facility] [address] 2020    Essential hypertension 2020    Hypothyroidism 2020    Hyperlipidemia 2020     DM II- BS going up. Pt would like to increase Rybelsus. Pt on Metformin, Glyburide and Jardiance. Pt has been trying to lose weight  HTN- stable on medications  HLD- stable  Hypothyroidism- stable  Pap smears- per Dr. Antonio Chino    Review of Systems   Constitutional: Negative for chills, diaphoresis and fever. HENT: Negative. Eyes: Negative. Respiratory: Negative for cough and shortness of breath. Cardiovascular: Negative for chest pain and palpitations. Gastrointestinal: Negative for abdominal pain, blood in stool, constipation, diarrhea and nausea. Genitourinary: Negative for difficulty urinating and dysuria. Musculoskeletal: Negative for back pain. Neurological: Negative for dizziness, light-headedness and headaches. Psychiatric/Behavioral: Negative for dysphoric mood. No Known Allergies      Prior to Visit Medications    Medication Sig Taking?  Authorizing Provider   PARoxetine (PAXIL) 10 MG tablet TAKE 1 TABLET EVERY MORNING Yes Skylar Prescott DO   metFORMIN (GLUCOPHAGE) 1000 MG tablet TAKE 1 TABLET TWICE A DAY WITH MEALS Yes Skylar Prescott DO   Semaglutide 7 MG TABS Take 1 tablet by mouth daily  Patient taking differently: Take 2 tablets by mouth daily  Yes Rachelle Motta DO   losartan (COZAAR) 100 MG tablet TAKE 1 TABLET DAILY Yes Rachelle Motta DO   levothyroxine (SYNTHROID) 175 MCG tablet TAKE 1 TABLET DAILY Yes Rachelle Motta DO   glyBURIDE (DIABETA) 5 MG tablet TAKE 1 TABLET EVERY MORNING AND 2 TABLETS EVERY EVENING WITH MEALS Yes Skylar Prescott, DO   amLODIPine (NORVASC) 5 MG tablet TAKE 1 TABLET DAILY Yes Skylar Prescott, DO   pravastatin (PRAVACHOL) 40 MG tablet TAKE 1 TABLET DAILY Yes Skylar Prescott, DO   hydroCHLOROthiazide (HYDRODIURIL) 25 MG tablet TAKE 2 TABLETS DAILY Yes Heydi Lacey, DO   aspirin 81 MG EC tablet Take 81 mg by mouth nightly Yes Historical Provider, MD   empagliflozin (JARDIANCE) 10 MG tablet Take 1 tablet by mouth daily Yes Heydi Lacey DO   montelukast (SINGULAIR) 10 MG tablet Take 10 mg by mouth daily Yes Historical Provider, MD   azelastine (ASTELIN) 0.1 % nasal spray 1 spray by Nasal route 2 times daily  Yes Historical Provider, MD   fluticasone (FLONASE) 50 MCG/ACT nasal spray 1 spray by Nasal route 2 times daily Yes Historical Provider, MD         Past Medical History:   Diagnosis Date    Allergic rhinitis     Anemia     Chronic rhinitis     Depressive disorder     Diabetes mellitus ([facility] [facility]     History of COVID-19     Hyperlipidemia     Hypertension     Hypothyroid     Morbid obesity ([facility] [facility]     Osteoarthrosis     Tricompartmental arthritis of the left knee    Radiculopathy, cervical region     Sensorimotor neuropathy 2014    low grade of Upper Extremities    Stricture and stenosis of cervix uteri        Past Surgical History:   Procedure Laterality Date    BACK SURGERY       SECTION      ENDOMETRIAL ABLATION         History reviewed. No pertinent family history.     Social History     Tobacco Use    Smoking status: Never Smoker    Smokeless tobacco: Never Used   Vaping Use    Vaping Use: Never used   Substance Use Topics    Alcohol use: No    Drug use: No       Objective   /70 (Site: Right Upper Arm, Position: Sitting, Cuff Size: Large Adult)   Pulse 94   Temp 96.8 °F (36 °C)   Ht 5' 3\" (1.6 m)   Wt (!) 302 lb 9.6 oz (137.3 kg)   SpO2 97%   BMI 53.60 kg/m²   Wt Readings from Last 3 Encounters:   02/15/22 (!) 302 lb 9.6 oz (137.3 kg)   10/01/21 (!) 312 lb 6.4 oz (141.7 kg)   06/03/21 (!) 311 lb 6.4 oz (141.3 kg)     There were no vitals filed for this visit. Physical Exam  Vitals reviewed. Constitutional:       General: She is not in acute distress. HENT:      Right Ear: Tympanic membrane normal.      Left Ear: Tympanic membrane normal.   Eyes:      Extraocular Movements: Extraocular movements intact. Conjunctiva/sclera: Conjunctivae normal.      Pupils: Pupils are equal, round, and reactive to light. Cardiovascular:      Rate and Rhythm: Normal rate and regular rhythm. Pulses:           Posterior tibial pulses are 2+ on the right side and 2+ on the left side. Pulmonary:      Effort: Pulmonary effort is normal. No respiratory distress. Breath sounds: Normal breath sounds. Abdominal:      General: Bowel sounds are normal.      Palpations: Abdomen is soft. Tenderness: There is no abdominal tenderness. Musculoskeletal:      Cervical back: Neck supple. Right lower leg: No edema. Left lower leg: No edema. Feet:      Right foot:      Protective Sensation: 9 sites tested. 9 sites sensed. Skin integrity: Dry skin present. Toenail Condition: Right toenails are normal.      Left foot:      Protective Sensation: 9 sites tested. 9 sites sensed. Skin integrity: Dry skin present. Toenail Condition: Left toenails are normal.   Skin:     Findings: No rash. Neurological:      General: No focal deficit present. Mental Status: She is alert and oriented to person, place, and time. Psychiatric:         Mood and Affect: Mood normal.         Assessment   Plan   1. Annual physical exam  -     CBC Auto Differential; Future  -     Hemoglobin A1C; Future  -     Comprehensive Metabolic Panel; Future  -     Lipid Panel; Future  -     TSH without Reflex; Future  -     T4, Free; Future  2.  Type 2 diabetes mellitus with hyperglycemia, without long-term current use of insulin (HCC)  -Increase Rybelsus to 14 mg  -     Hemoglobin A1C; Future  -     Diabetic Foot Exam  3. Essential hypertension  4. Hyperlipidemia, unspecified hyperlipidemia type  -     Lipid Panel; Future  5. Hypothyroidism, unspecified type  -     TSH without Reflex; Future  -     T4, Free; Future  6. Screening for colon cancer  -     Fecal DNA Colorectal cancer screening (Cologuard)    The patient is asked to make an attempt to improve diet and exercise patterns   Declines diabetic education at this time  Continue medications   Keep f/u with Gyn    Personalized Preventive Plan   Current Health Maintenance Status  Immunization History   Administered Date(s) Administered    Tdap (Boostrix, Adacel) 08/25/2019        Health Maintenance   Topic Date Due    COVID-19 Vaccine (1) Never done    Cervical cancer screen  Never done    Colon cancer screen colonoscopy  Never done    TSH testing  02/20/2022    Shingles Vaccine (1 of 2) 06/03/2022 (Originally 2/25/2011)    Pneumococcal 0-64 years Vaccine (1 of 2 - PPSV23) 06/03/2022 (Originally 2/25/1967)    Flu vaccine (1) 10/01/2022 (Originally 9/1/2021)    Depression Screen  03/03/2022    Diabetic microalbuminuria test  05/22/2022    Potassium monitoring  05/22/2022    Creatinine monitoring  05/22/2022    A1C test (Diabetic or Prediabetic)  09/25/2022    Lipid screen  09/25/2022    Diabetic foot exam  02/15/2023    Diabetic retinal exam  05/29/2023    Breast cancer screen  06/18/2023    DTaP/Tdap/Td vaccine (2 - Td or Tdap) 08/25/2029    Hepatitis C screen  Completed    Hepatitis A vaccine  Aged Out    Hib vaccine  Aged Out    Meningococcal (ACWY) vaccine  Aged Out    HIV screen  Discontinued     Recommendations for Ozmosis Due: see orders and patient instructions/AVS.    Return in about 4 months (around 6/15/2022) for Diabetes.

## 2022-03-02 LAB — NONINV COLON CA DNA+OCC BLD SCRN STL QL: NEGATIVE

## 2022-03-18 DIAGNOSIS — E11.65 TYPE 2 DIABETES MELLITUS WITH HYPERGLYCEMIA, WITHOUT LONG-TERM CURRENT USE OF INSULIN (HCC): ICD-10-CM

## 2022-03-18 RX ORDER — ORAL SEMAGLUTIDE 14 MG/1
1 TABLET ORAL DAILY
Qty: 90 TABLET | Refills: 1 | Status: SHIPPED | OUTPATIENT
Start: 2022-03-18 | End: 2022-09-07

## 2022-03-21 DIAGNOSIS — E11.65 TYPE 2 DIABETES MELLITUS WITH HYPERGLYCEMIA, WITHOUT LONG-TERM CURRENT USE OF INSULIN (HCC): ICD-10-CM

## 2022-03-21 DIAGNOSIS — I10 ESSENTIAL HYPERTENSION: ICD-10-CM

## 2022-03-22 RX ORDER — PRAVASTATIN SODIUM 40 MG
TABLET ORAL
Qty: 90 TABLET | Refills: 1 | Status: SHIPPED | OUTPATIENT
Start: 2022-03-22 | End: 2022-09-07

## 2022-03-22 RX ORDER — GLYBURIDE 5 MG/1
TABLET ORAL
Qty: 270 TABLET | Refills: 1 | Status: SHIPPED | OUTPATIENT
Start: 2022-03-22 | End: 2022-09-07

## 2022-03-22 RX ORDER — LOSARTAN POTASSIUM 100 MG/1
TABLET ORAL
Qty: 90 TABLET | Refills: 1 | Status: SHIPPED | OUTPATIENT
Start: 2022-03-22 | End: 2022-09-07

## 2022-03-22 RX ORDER — AMLODIPINE BESYLATE 5 MG/1
TABLET ORAL
Qty: 90 TABLET | Refills: 1 | Status: SHIPPED | OUTPATIENT
Start: 2022-03-22 | End: 2022-09-07

## 2022-03-22 RX ORDER — EMPAGLIFLOZIN 10 MG/1
TABLET, FILM COATED ORAL
Qty: 90 TABLET | Refills: 1 | Status: SHIPPED | OUTPATIENT
Start: 2022-03-22 | End: 2022-09-07

## 2022-04-04 ENCOUNTER — HOSPITAL ENCOUNTER (OUTPATIENT)
Age: 62
Discharge: HOME | End: 2022-04-04
Payer: COMMERCIAL

## 2022-04-04 DIAGNOSIS — Z01.818 ENCOUNTER FOR PREADMISSION TESTING: ICD-10-CM

## 2022-04-04 LAB
ANION GAP SERPL CALCULATED.3IONS-SCNC: 17 MMOL/L (ref 4–16)
BASOPHILS ABSOLUTE: 0.1 K/CU MM
BASOPHILS RELATIVE PERCENT: 0.8 % (ref 0–1)
BUN BLDV-MCNC: 15 MG/DL (ref 6–23)
CALCIUM SERPL-MCNC: 10.3 MG/DL (ref 8.3–10.6)
CHLORIDE BLD-SCNC: 99 MMOL/L (ref 99–110)
CO2: 23 MMOL/L (ref 21–32)
CREAT SERPL-MCNC: 0.6 MG/DL (ref 0.6–1.1)
DIFFERENTIAL TYPE: ABNORMAL
EOSINOPHILS ABSOLUTE: 0.2 K/CU MM
EOSINOPHILS RELATIVE PERCENT: 1.7 % (ref 0–3)
GFR AFRICAN AMERICAN: >60 ML/MIN/1.73M2
GFR NON-AFRICAN AMERICAN: >60 ML/MIN/1.73M2
GLUCOSE BLD-MCNC: 114 MG/DL (ref 70–99)
HCT VFR BLD CALC: 50.7 % (ref 37–47)
HEMOGLOBIN: 17 GM/DL (ref 12.5–16)
IMMATURE NEUTROPHIL %: 0.6 % (ref 0–0.43)
LYMPHOCYTES ABSOLUTE: 3.1 K/CU MM
LYMPHOCYTES RELATIVE PERCENT: 31.8 % (ref 24–44)
MCH RBC QN AUTO: 30.5 PG (ref 27–31)
MCHC RBC AUTO-ENTMCNC: 33.5 % (ref 32–36)
MCV RBC AUTO: 91 FL (ref 78–100)
MONOCYTES ABSOLUTE: 0.7 K/CU MM
MONOCYTES RELATIVE PERCENT: 7.4 % (ref 0–4)
NUCLEATED RBC %: 0 %
PDW BLD-RTO: 13.6 % (ref 11.7–14.9)
PLATELET # BLD: 252 K/CU MM (ref 140–440)
PMV BLD AUTO: 10.4 FL (ref 7.5–11.1)
POTASSIUM SERPL-SCNC: 3.8 MMOL/L (ref 3.5–5.1)
RBC # BLD: 5.57 M/CU MM (ref 4.2–5.4)
SEGMENTED NEUTROPHILS ABSOLUTE COUNT: 5.6 K/CU MM
SEGMENTED NEUTROPHILS RELATIVE PERCENT: 57.7 % (ref 36–66)
SODIUM BLD-SCNC: 139 MMOL/L (ref 135–145)
TOTAL IMMATURE NEUTOROPHIL: 0.06 K/CU MM
TOTAL NUCLEATED RBC: 0 K/CU MM
WBC # BLD: 9.7 K/CU MM (ref 4–10.5)

## 2022-04-04 PROCEDURE — 93005 ELECTROCARDIOGRAM TRACING: CPT | Performed by: ANESTHESIOLOGY

## 2022-04-04 PROCEDURE — 36415 COLL VENOUS BLD VENIPUNCTURE: CPT

## 2022-04-04 PROCEDURE — 80048 BASIC METABOLIC PNL TOTAL CA: CPT

## 2022-04-04 PROCEDURE — 85025 COMPLETE CBC W/AUTO DIFF WBC: CPT

## 2022-04-04 NOTE — PROGRESS NOTES
4/4/22 0836 I spoke to patients spouse and he said she is in the doctors office right now, but he wll have her call me as soon as she is done. ADDENDUM:4/4/22 [id number] Patient returned my call and completed PAT. She will be back later at 1400 to do labs and EKG. Covid screening done - 4/4/22    Surgery is at [address] [place] [address] on 4/6/22 . You will be called on 4/5/22  with times. 1. Do not eat or drink anything after midnight - unless instructed by your doctor prior to surgery. This includes no water, chewing gum or mints. 2. Follow your directions as prescribed by the doctor for your procedure and medications. 3. Check with your Doctor regarding stopping vitamins, supplements, blood thinners (Plavix, Coumadin, Lovenox, Effient, Pradaxa, Xarelto, Fragmin or other blood thinners) and follow their instructions. Stop all supplements and herbals until after surgery. Aspirin on hold 5 days prior to surgery. Morning of surgery take amlodipine, levothyroxine,  Metformin, and singulair with a sip of water. 4. Do not smoke, and do not drink any alcoholic beverages 24 hours prior to surgery. This includes NA Beer. 5. You may brush your teeth and gargle the morning of surgery. DO NOT SWALLOW WATER   6. You MUST make arrangements for a responsible adult to take you home after your surgery and be able to check on you every couple hours for the day. You will not be allowed to leave alone or drive yourself home. It is strongly suggested someone stay with you the first 24  hrs. Your surgery will be cancelled if you do not have a ride home. 7. Please wear simple, loose fitting clothing to the hospital.  [place] not bring valuables (money, credit cards, checkbooks, etc.) Do not wear any makeup (including no eye makeup) or nail polish on your fingers or toes. 8. DO NOT wear any jewelry or piercings on day of surgery. All body piercing jewelry must be removed.              9. If you have dentures, they will be removed before going to the OR; we will provide you a container. If you wear contact lenses or glasses,  they will be removed; please bring a case for them. 10. If you  have a Living Will and Durable Power of  for Healthcare, please bring in a copy. 11. Please bring picture ID,  insurance card, paperwork from the doctors office    (H & P, Consent, & card for implantable devices). 12. Take a shower the night before or morning of your procedure, do not apply any lotion, oil or powder. It is recommended to use Hibiclens or CHG wash during pre-op shower/bath.            13. Wear a mask covering your nose & mouth when entering the hospital.

## 2022-04-05 ENCOUNTER — ANESTHESIA EVENT (OUTPATIENT)
Dept: OPERATING ROOM | Age: 62
End: 2022-04-05
Payer: COMMERCIAL

## 2022-04-05 LAB
EKG ATRIAL RATE: 86 BPM
EKG DIAGNOSIS: NORMAL
EKG P AXIS: 52 DEGREES
EKG P-R INTERVAL: 174 MS
EKG Q-T INTERVAL: 376 MS
EKG QRS DURATION: 82 MS
EKG QTC CALCULATION (BAZETT): 449 MS
EKG R AXIS: -1 DEGREES
EKG T AXIS: 70 DEGREES
EKG VENTRICULAR RATE: 86 BPM

## 2022-04-05 PROCEDURE — 93010 ELECTROCARDIOGRAM REPORT: CPT | Performed by: INTERNAL MEDICINE

## 2022-04-05 NOTE — PROGRESS NOTES
Called and confirmed with patient surgery at [facility]  on 4/6/22 at 1130 with an arrival time of  0930. Come into the Main entrance and check in. You can bring two people with you and wear a mask. Patient verbalized understanding.

## 2022-04-05 NOTE — ANESTHESIA PRE PROCEDURE
Department of Anesthesiology  Preprocedure Note       Name:  Tiana Lua   Age:  65 y.o.  :  1960                                          MRN:  [id number]         Date:  2022      Surgeon: Concepción Kruger):  Marcela Miller MD    Procedure: Procedure(s):  RIGHT SHOULDER ARTHROSCOPY ROTATOR CUFF REPAIR SUBACROMIAL DECOMPRESSION    Medications prior to admission:   Prior to Admission medications    Medication Sig Start Date End Date Taking? Authorizing Provider   metFORMIN (GLUCOPHAGE) 1000 MG tablet TAKE 1 TABLET TWICE A DAY WITH MEALS 3/22/22   Sonda Kayce, DO   glyBURIDE (DIABETA) 5 MG tablet TAKE 1 TABLET EVERY MORNING AND 2 TABLETS EVERY EVENING WITH MEALS 3/22/22   Sonda Kayce, DO   losartan (COZAAR) 100 MG tablet TAKE 1 TABLET DAILY 3/22/22   Sonda Kayce, DO   amLODIPine (NORVASC) 5 MG tablet TAKE 1 TABLET DAILY 3/22/22   Sonda Kayce, DO   JARDIANCE 10 MG tablet TAKE 1 TABLET DAILY 3/22/22   Sonda Kayce, DO   pravastatin (PRAVACHOL) 40 MG tablet TAKE 1 TABLET DAILY 3/22/22   [facility]   Semaglutide (RYBELSUS) 14 MG TABS Take 1 tablet by mouth daily 3/18/22   Sonda Kayce, DO   PARoxetine (PAXIL) 10 MG tablet TAKE 1 TABLET EVERY MORNING 22   [facility]   levothyroxine (SYNTHROID) 175 MCG tablet TAKE 1 TABLET DAILY 10/1/21   Sonda Kayce, DO   hydroCHLOROthiazide (HYDRODIURIL) 25 MG tablet TAKE 2 TABLETS DAILY 21   Sonda Kayce, DO   aspirin 81 MG EC tablet Take 81 mg by mouth nightly    Historical Provider, MD   montelukast (SINGULAIR) 10 MG tablet Take 10 mg by mouth daily 21   Historical Provider, MD   azelastine (ASTELIN) 0.1 % nasal spray 1 spray by Nasal route 2 times daily  21   Historical Provider, MD   fluticasone (FLONASE) 50 MCG/ACT nasal spray 1 spray by Nasal route 2 times daily 21   Historical Provider, MD       Current medications:    No current facility-administered medications for this encounter.      Current Outpatient Medications Medication Sig Dispense Refill    metFORMIN (GLUCOPHAGE) 1000 MG tablet TAKE 1 TABLET TWICE A DAY WITH MEALS 180 tablet 1    glyBURIDE (DIABETA) 5 MG tablet TAKE 1 TABLET EVERY MORNING AND 2 TABLETS EVERY EVENING WITH MEALS 270 tablet 1    losartan (COZAAR) 100 MG tablet TAKE 1 TABLET DAILY 90 tablet 1    amLODIPine (NORVASC) 5 MG tablet TAKE 1 TABLET DAILY 90 tablet 1    JARDIANCE 10 MG tablet TAKE 1 TABLET DAILY 90 tablet 1    pravastatin (PRAVACHOL) 40 MG tablet TAKE 1 TABLET DAILY 90 tablet 1    Semaglutide (RYBELSUS) 14 MG TABS Take 1 tablet by mouth daily 90 tablet 1    PARoxetine (PAXIL) 10 MG tablet TAKE 1 TABLET EVERY MORNING 90 tablet 1    levothyroxine (SYNTHROID) 175 MCG tablet TAKE 1 TABLET DAILY 90 tablet 1    hydroCHLOROthiazide (HYDRODIURIL) 25 MG tablet TAKE 2 TABLETS DAILY 180 tablet 1    aspirin 81 MG EC tablet Take 81 mg by mouth nightly      montelukast (SINGULAIR) 10 MG tablet Take 10 mg by mouth daily      azelastine (ASTELIN) 0.1 % nasal spray 1 spray by Nasal route 2 times daily       fluticasone (FLONASE) 50 MCG/ACT nasal spray 1 spray by Nasal route 2 times daily         Allergies:  No Known Allergies    Problem List:    Patient Active Problem List   Diagnosis Code    Type 2 diabetes mellitus with hyperglycemia, without long-term current use of insulin ([facility]) E11.65    Essential hypertension I10    Hypothyroidism E03.9    Hyperlipidemia E78.5    Fullness of supraclavicular fossa R22.2    Chronic rhinitis J31.0       Past Medical History:        Diagnosis Date    Allergic rhinitis     Anemia     Chronic rhinitis     Diabetes mellitus (HCC)     History of COVID-19     Hyperlipidemia     Hypertension     Hypothyroid     Morbid obesity ([facility])     Osteoarthrosis     Tricompartmental arthritis of the left knee    Radiculopathy, cervical region     Sensorimotor neuropathy 2014    low grade of Upper Extremities    Stricture and stenosis of cervix uteri        Past Surgical History:        Procedure Laterality Date    BACK SURGERY       SECTION      ENDOMETRIAL ABLATION         Social History:    Social History     Tobacco Use    Smoking status: Never Smoker    Smokeless tobacco: Never Used   Substance Use Topics    Alcohol use: No                                Counseling given: Not Answered      Vital Signs (Current):   Vitals:    22 0856   Weight: 300 lb (136.1 kg)   Height: 5' 3\" (1.6 m)                                              BP Readings from Last 3 Encounters:   02/15/22 128/70   10/01/21 130/89   21 130/80       NPO Status:                                                                                 BMI:   Wt Readings from Last 3 Encounters:   02/15/22 (!) 302 lb 9.6 oz (137.3 kg)   10/01/21 (!) 312 lb 6.4 oz (141.7 kg)   21 (!) 311 lb 6.4 oz (141.3 kg)     Body mass index is 53.14 kg/m². CBC:   Lab Results   Component Value Date    WBC 9.7 2022    RBC 5.57 2022    HGB 17.0 2022    HCT 50.7 2022    MCV 91.0 2022    RDW 13.6 2022     2022       CMP:   Lab Results   Component Value Date     2022    K 3.8 2022    CL 99 2022    CO2 23 2022    BUN 15 2022    CREATININE 0.6 2022    GFRAA >60 2022    AGRATIO 1.4 2021    LABGLOM >60 2022    GLUCOSE 114 2022    PROT 7.6 2021    PROT 7.5 2019    CALCIUM 10.3 2022    BILITOT 0.8 2021    ALKPHOS 73 2021    AST 26 2021    ALT 33 2021       POC Tests: No results for input(s): POCGLU, POCNA, POCK, POCCL, POCBUN, POCHEMO, POCHCT in the last 72 hours.     Coags:   Lab Results   Component Value Date    PROTIME 10.9 2013    PROTIME 11.4 2011    INR 1.01 2013    APTT 27.2 2013       HCG (If Applicable): No results found for: PREGTESTUR, PREGSERUM, HCG, HCGQUANT     ABGs: No results found for: PHART, PO2ART, KZV3FOI, OVL8BZF, BEART, V8NAOEYT     Type & Screen (If Applicable):  No results found for: LABABO, LABRH    Drug/Infectious Status (If Applicable):  No results found for: HIV, HEPCAB    COVID-19 Screening (If Applicable): No results found for: COVID19        Anesthesia Evaluation    Airway:         Dental:          Pulmonary:                              Cardiovascular:    (+) hypertension:,                   Neuro/Psych:   (+) neuromuscular disease:,             GI/Hepatic/Renal:   (+) morbid obesity          Endo/Other:    (+) Diabetes, hypothyroidism::., .                 Abdominal:             Vascular: Other Findings:             Anesthesia Plan      general and regional     ASA 3     (Chart review)  Induction: intravenous.                           NEETA Holt CRNA   4/5/2022

## 2022-04-05 NOTE — PROGRESS NOTES
I spoke to Elizabeth Mann in Dr. Mello Fruit office and reported patient did her pre-op testing labs and EKG per PAT Anesthesia protocol under Dr. Rex Harris on 4/4/22 and the only abnormal findings are, Blood Glucose 114, Anion gap 14, RBC's 57, and H&H 17.0/50.7

## 2022-04-06 ENCOUNTER — HOSPITAL ENCOUNTER (OUTPATIENT)
Age: 62
Setting detail: OUTPATIENT SURGERY
Discharge: HOME | End: 2022-04-06
Attending: ORTHOPAEDIC SURGERY | Admitting: ORTHOPAEDIC SURGERY
Payer: COMMERCIAL

## 2022-04-06 ENCOUNTER — ANESTHESIA (OUTPATIENT)
Dept: OPERATING ROOM | Age: 62
End: 2022-04-06
Payer: COMMERCIAL

## 2022-04-06 VITALS
WEIGHT: 293 LBS | TEMPERATURE: 96.9 F | BODY MASS INDEX: 51.91 KG/M2 | HEART RATE: 88 BPM | DIASTOLIC BLOOD PRESSURE: 67 MMHG | HEIGHT: 63 IN | SYSTOLIC BLOOD PRESSURE: 120 MMHG | RESPIRATION RATE: 18 BRPM | OXYGEN SATURATION: 92 %

## 2022-04-06 VITALS
SYSTOLIC BLOOD PRESSURE: 128 MMHG | OXYGEN SATURATION: 91 % | RESPIRATION RATE: 14 BRPM | DIASTOLIC BLOOD PRESSURE: 89 MMHG

## 2022-04-06 DIAGNOSIS — Z01.818 ENCOUNTER FOR PREADMISSION TESTING: Primary | ICD-10-CM

## 2022-04-06 LAB
GLUCOSE BLD-MCNC: 102 MG/DL (ref 70–99)
GLUCOSE BLD-MCNC: 162 MG/DL (ref 70–99)

## 2022-04-06 PROCEDURE — 6360000002 HC RX W HCPCS: Performed by: ORTHOPAEDIC SURGERY

## 2022-04-06 PROCEDURE — 82962 GLUCOSE BLOOD TEST: CPT

## 2022-04-06 PROCEDURE — 6360000002 HC RX W HCPCS

## 2022-04-06 PROCEDURE — 2709999900 HC NON-CHARGEABLE SUPPLY: Performed by: ORTHOPAEDIC SURGERY

## 2022-04-06 PROCEDURE — C1769 GUIDE WIRE: HCPCS | Performed by: ORTHOPAEDIC SURGERY

## 2022-04-06 PROCEDURE — 64415 NJX AA&/STRD BRCH PLXS IMG: CPT | Performed by: ANESTHESIOLOGY

## 2022-04-06 PROCEDURE — 7100000010 HC PHASE II RECOVERY - FIRST 15 MIN: Performed by: ORTHOPAEDIC SURGERY

## 2022-04-06 PROCEDURE — 3600000014 HC SURGERY LEVEL 4 ADDTL 15MIN: Performed by: ORTHOPAEDIC SURGERY

## 2022-04-06 PROCEDURE — 7100000001 HC PACU RECOVERY - ADDTL 15 MIN: Performed by: ORTHOPAEDIC SURGERY

## 2022-04-06 PROCEDURE — 2720000010 HC SURG SUPPLY STERILE: Performed by: ORTHOPAEDIC SURGERY

## 2022-04-06 PROCEDURE — 2580000003 HC RX 258: Performed by: ANESTHESIOLOGY

## 2022-04-06 PROCEDURE — C1713 ANCHOR/SCREW BN/BN,TIS/BN: HCPCS | Performed by: ORTHOPAEDIC SURGERY

## 2022-04-06 PROCEDURE — 3700000001 HC ADD 15 MINUTES (ANESTHESIA): Performed by: ORTHOPAEDIC SURGERY

## 2022-04-06 PROCEDURE — 7100000000 HC PACU RECOVERY - FIRST 15 MIN: Performed by: ORTHOPAEDIC SURGERY

## 2022-04-06 PROCEDURE — 6360000002 HC RX W HCPCS: Performed by: ANESTHESIOLOGY

## 2022-04-06 PROCEDURE — 2580000003 HC RX 258

## 2022-04-06 PROCEDURE — 2500000003 HC RX 250 WO HCPCS

## 2022-04-06 PROCEDURE — 7100000011 HC PHASE II RECOVERY - ADDTL 15 MIN: Performed by: ORTHOPAEDIC SURGERY

## 2022-04-06 PROCEDURE — 3700000000 HC ANESTHESIA ATTENDED CARE: Performed by: ORTHOPAEDIC SURGERY

## 2022-04-06 PROCEDURE — 3600000004 HC SURGERY LEVEL 4 BASE: Performed by: ORTHOPAEDIC SURGERY

## 2022-04-06 DEVICE — ANCHOR SUT L14.7MM DIA5.5MM BIOCOM SZ 2 ORDER MULT OF 5EA: Type: IMPLANTABLE DEVICE | Site: SHOULDER | Status: FUNCTIONAL

## 2022-04-06 RX ORDER — IPRATROPIUM BROMIDE AND ALBUTEROL SULFATE 2.5; .5 MG/3ML; MG/3ML
1 SOLUTION RESPIRATORY (INHALATION)
Status: DISCONTINUED | OUTPATIENT
Start: 2022-04-06 | End: 2022-04-06 | Stop reason: HOSPADM

## 2022-04-06 RX ORDER — HYDRALAZINE HYDROCHLORIDE 20 MG/ML
10 INJECTION INTRAMUSCULAR; INTRAVENOUS
Status: DISCONTINUED | OUTPATIENT
Start: 2022-04-06 | End: 2022-04-06 | Stop reason: HOSPADM

## 2022-04-06 RX ORDER — METOCLOPRAMIDE HYDROCHLORIDE 5 MG/ML
10 INJECTION INTRAMUSCULAR; INTRAVENOUS
Status: DISCONTINUED | OUTPATIENT
Start: 2022-04-06 | End: 2022-04-06 | Stop reason: HOSPADM

## 2022-04-06 RX ORDER — SODIUM CHLORIDE, SODIUM LACTATE, POTASSIUM CHLORIDE, CALCIUM CHLORIDE 600; 310; 30; 20 MG/100ML; MG/100ML; MG/100ML; MG/100ML
INJECTION, SOLUTION INTRAVENOUS CONTINUOUS PRN
Status: DISCONTINUED | OUTPATIENT
Start: 2022-04-06 | End: 2022-04-06 | Stop reason: SDUPTHER

## 2022-04-06 RX ORDER — LIDOCAINE HYDROCHLORIDE 20 MG/ML
INJECTION, SOLUTION INTRAVENOUS PRN
Status: DISCONTINUED | OUTPATIENT
Start: 2022-04-06 | End: 2022-04-06 | Stop reason: SDUPTHER

## 2022-04-06 RX ORDER — MEPERIDINE HYDROCHLORIDE 25 MG/ML
12.5 INJECTION INTRAMUSCULAR; INTRAVENOUS; SUBCUTANEOUS EVERY 5 MIN PRN
Status: DISCONTINUED | OUTPATIENT
Start: 2022-04-06 | End: 2022-04-06 | Stop reason: HOSPADM

## 2022-04-06 RX ORDER — FENTANYL CITRATE 50 UG/ML
INJECTION, SOLUTION INTRAMUSCULAR; INTRAVENOUS PRN
Status: DISCONTINUED | OUTPATIENT
Start: 2022-04-06 | End: 2022-04-06 | Stop reason: SDUPTHER

## 2022-04-06 RX ORDER — FENTANYL CITRATE 50 UG/ML
50 INJECTION, SOLUTION INTRAMUSCULAR; INTRAVENOUS EVERY 5 MIN PRN
Status: DISCONTINUED | OUTPATIENT
Start: 2022-04-06 | End: 2022-04-06 | Stop reason: HOSPADM

## 2022-04-06 RX ORDER — DIPHENHYDRAMINE HYDROCHLORIDE 50 MG/ML
12.5 INJECTION INTRAMUSCULAR; INTRAVENOUS
Status: DISCONTINUED | OUTPATIENT
Start: 2022-04-06 | End: 2022-04-06 | Stop reason: HOSPADM

## 2022-04-06 RX ORDER — SODIUM CHLORIDE, SODIUM LACTATE, POTASSIUM CHLORIDE, CALCIUM CHLORIDE 600; 310; 30; 20 MG/100ML; MG/100ML; MG/100ML; MG/100ML
INJECTION, SOLUTION INTRAVENOUS CONTINUOUS
Status: DISCONTINUED | OUTPATIENT
Start: 2022-04-06 | End: 2022-04-06 | Stop reason: HOSPADM

## 2022-04-06 RX ORDER — ONDANSETRON 2 MG/ML
INJECTION INTRAMUSCULAR; INTRAVENOUS PRN
Status: DISCONTINUED | OUTPATIENT
Start: 2022-04-06 | End: 2022-04-06 | Stop reason: SDUPTHER

## 2022-04-06 RX ORDER — ROPIVACAINE HYDROCHLORIDE 5 MG/ML
INJECTION, SOLUTION EPIDURAL; INFILTRATION; PERINEURAL
Status: COMPLETED | OUTPATIENT
Start: 2022-04-06 | End: 2022-04-06

## 2022-04-06 RX ORDER — LABETALOL HYDROCHLORIDE 5 MG/ML
10 INJECTION, SOLUTION INTRAVENOUS
Status: DISCONTINUED | OUTPATIENT
Start: 2022-04-06 | End: 2022-04-06 | Stop reason: HOSPADM

## 2022-04-06 RX ORDER — CEFAZOLIN SODIUM 2 G/100ML
2000 INJECTION, SOLUTION INTRAVENOUS ONCE
Status: COMPLETED | OUTPATIENT
Start: 2022-04-06 | End: 2022-04-06

## 2022-04-06 RX ORDER — MIDAZOLAM HYDROCHLORIDE 1 MG/ML
INJECTION INTRAMUSCULAR; INTRAVENOUS PRN
Status: DISCONTINUED | OUTPATIENT
Start: 2022-04-06 | End: 2022-04-06 | Stop reason: SDUPTHER

## 2022-04-06 RX ORDER — ROCURONIUM BROMIDE 10 MG/ML
INJECTION, SOLUTION INTRAVENOUS PRN
Status: DISCONTINUED | OUTPATIENT
Start: 2022-04-06 | End: 2022-04-06 | Stop reason: SDUPTHER

## 2022-04-06 RX ORDER — PROPOFOL 10 MG/ML
INJECTION, EMULSION INTRAVENOUS PRN
Status: DISCONTINUED | OUTPATIENT
Start: 2022-04-06 | End: 2022-04-06 | Stop reason: SDUPTHER

## 2022-04-06 RX ADMIN — ROPIVACAINE HYDROCHLORIDE 20 ML: 5 INJECTION, SOLUTION EPIDURAL; INFILTRATION; PERINEURAL at 11:48

## 2022-04-06 RX ADMIN — CEFAZOLIN SODIUM 2000 MG: 2 INJECTION, SOLUTION INTRAVENOUS at 12:11

## 2022-04-06 RX ADMIN — SUGAMMADEX 200 MG: 100 INJECTION, SOLUTION INTRAVENOUS at 13:15

## 2022-04-06 RX ADMIN — MIDAZOLAM 2 MG: 1 INJECTION INTRAMUSCULAR; INTRAVENOUS at 11:38

## 2022-04-06 RX ADMIN — PROPOFOL 200 MG: 10 INJECTION, EMULSION INTRAVENOUS at 11:59

## 2022-04-06 RX ADMIN — SODIUM CHLORIDE, POTASSIUM CHLORIDE, SODIUM LACTATE AND CALCIUM CHLORIDE: 600; 310; 30; 20 INJECTION, SOLUTION INTRAVENOUS at 11:56

## 2022-04-06 RX ADMIN — LIDOCAINE HYDROCHLORIDE 100 MG: 20 INJECTION, SOLUTION INTRAVENOUS at 11:59

## 2022-04-06 RX ADMIN — ROCURONIUM BROMIDE 40 MG: 50 INJECTION, SOLUTION INTRAVENOUS at 11:59

## 2022-04-06 RX ADMIN — PHENYLEPHRINE HYDROCHLORIDE 100 MCG: 10 INJECTION INTRAVENOUS at 12:39

## 2022-04-06 RX ADMIN — ONDANSETRON 4 MG: 2 INJECTION INTRAMUSCULAR; INTRAVENOUS at 13:11

## 2022-04-06 RX ADMIN — SODIUM CHLORIDE, POTASSIUM CHLORIDE, SODIUM LACTATE AND CALCIUM CHLORIDE: 600; 310; 30; 20 INJECTION, SOLUTION INTRAVENOUS at 10:17

## 2022-04-06 RX ADMIN — FENTANYL CITRATE 100 MCG: 50 INJECTION, SOLUTION INTRAMUSCULAR; INTRAVENOUS at 11:59

## 2022-04-06 ASSESSMENT — PULMONARY FUNCTION TESTS
PIF_VALUE: 22
PIF_VALUE: 23
PIF_VALUE: 23
PIF_VALUE: 33
PIF_VALUE: 34
PIF_VALUE: 25
PIF_VALUE: 22
PIF_VALUE: 21
PIF_VALUE: 23
PIF_VALUE: 26
PIF_VALUE: 21
PIF_VALUE: 23
PIF_VALUE: 22
PIF_VALUE: 23
PIF_VALUE: 0
PIF_VALUE: 24
PIF_VALUE: 21
PIF_VALUE: 20
PIF_VALUE: 21
PIF_VALUE: 23
PIF_VALUE: 20
PIF_VALUE: 23
PIF_VALUE: 21
PIF_VALUE: 22
PIF_VALUE: 2
PIF_VALUE: 25
PIF_VALUE: 26
PIF_VALUE: 66
PIF_VALUE: 27
PIF_VALUE: 23
PIF_VALUE: 20
PIF_VALUE: 24
PIF_VALUE: 23
PIF_VALUE: 3
PIF_VALUE: 23
PIF_VALUE: 24
PIF_VALUE: 1
PIF_VALUE: 23
PIF_VALUE: 22
PIF_VALUE: 2
PIF_VALUE: 22
PIF_VALUE: 20
PIF_VALUE: 21
PIF_VALUE: 23
PIF_VALUE: 26
PIF_VALUE: 20
PIF_VALUE: 22
PIF_VALUE: 23
PIF_VALUE: 22
PIF_VALUE: 22
PIF_VALUE: 23
PIF_VALUE: 22
PIF_VALUE: 20
PIF_VALUE: 23
PIF_VALUE: 23
PIF_VALUE: 31
PIF_VALUE: 25
PIF_VALUE: 32
PIF_VALUE: 2
PIF_VALUE: 23
PIF_VALUE: 24
PIF_VALUE: 25
PIF_VALUE: 27
PIF_VALUE: 23
PIF_VALUE: 2
PIF_VALUE: 22
PIF_VALUE: 23
PIF_VALUE: 3
PIF_VALUE: 25
PIF_VALUE: 23
PIF_VALUE: 3
PIF_VALUE: 22
PIF_VALUE: 26
PIF_VALUE: 21
PIF_VALUE: 26
PIF_VALUE: 20
PIF_VALUE: 21
PIF_VALUE: 23
PIF_VALUE: 22

## 2022-04-06 ASSESSMENT — PAIN DESCRIPTION - DESCRIPTORS: DESCRIPTORS: TINGLING;DISCOMFORT

## 2022-04-06 ASSESSMENT — PAIN - FUNCTIONAL ASSESSMENT: PAIN_FUNCTIONAL_ASSESSMENT: 0-10

## 2022-04-06 ASSESSMENT — PAIN SCALES - GENERAL: PAINLEVEL_OUTOF10: 2

## 2022-04-06 NOTE — PROGRESS NOTES
1405:  Pt received from PACU alert and oriented with 2/10 c/o right shoulder discomfort. Drsg in place to right shoulder with no bleeding or drainage noted. Pt able to move fingers to right hand, hand warm to the touch. VSS, Pt tolerating PO.

## 2022-04-06 NOTE — PROGRESS NOTES
1330 - transferred from OR on cart, monitor applied, alarms on and verified, bedside handoff provided by Andres Alvares RN and [facility]  2152 - tolerating ice chips  1400 - phase one care complete; transferred to [facility]

## 2022-04-06 NOTE — PROGRESS NOTES
1520:  Pt discharged to home alert and oriented with 4/10 c/o right shoulder discomfort. Drsg in place to right shoulder with no bleeding or drainage present. Pt able to move fingers to right hand, hand warm and dry. Pt tolerating PO, VSS.

## 2022-04-06 NOTE — BRIEF OP NOTE
Operative Report    Patient name  Mabel Chua   Date of Birth   1960  MRN   [id number]   Date of Procedure 4/6/2022    Pre-operative Diagnosis: 1. Right Shoulder Rotator Cuff tear      2. Right ImpingementSyndrome      Post-operative Diagnosis:  Same      Procedure:    1. Right Shoulder arthroscopy      2. Arthroscopic Rotator Cuff Repair      3. Subacromial Decompression      4. Debridement intra-articular      Surgeon:     Jonah Shaw MD     Assistant(s):    None    Anesthesia:   General endotrachial anesthesia and Regional    Estimated blood loss:   Less than 50cc    Specimens:    No Taken    Complications:    None    Condition:     Stable      See dictated operative report for full details. Patient tolerated the procedure well and returned to recovery in stable condition. Postoperative orders and instructions have been provided.     Jonah Shaw MD

## 2022-04-06 NOTE — H&P
HISTORY AND PHYSICAL    Patient Name:   Sean Block   (1960)  MRN     [id number]   Today's date:    2022     CHIEF COMPLAINT:  Right Shoulder pain      HISTORY OF PRESENT ILLNESS:      The patient is a 65 y.o. female  who presents with Continued pain in her right shoulder    She states that her previous steroid injection worked great for about 9 months. She has been doing a home exercise   program with no improvement. She has been taking Tylenol. She states she cannot tolerate ibuprofen as it irritates her stomach. She is having progressive ongoing pain with regards to her right shoulder    She states she is unable to sleep and does have to sleep in a chair for any measurable amount of sleep to obtain due to her pain. She is here for right shoulder arthroscopy. Past Medical History         Diagnosis Date    Allergic rhinitis     Anemia     Chronic rhinitis     Diabetes mellitus ([address]     History of COVID-19     Hyperlipidemia     Hypertension     Hypothyroid     [facility]bid [facility]     Osteoarthrosis     Tricompartmental arthritis of the left knee    Radiculopathy, cervical region     Sensorimotor neuropathy 2014    low grade of Upper Extremities    Stricture and stenosis of cervix uteri        Past Surgical History         Procedure Laterality Date    BACK SURGERY       SECTION      ENDOMETRIAL ABLATION         Medications Prior to Admission:     Prior to Admission medications    Medication Sig Start Date End Date Taking?  Authorizing Provider   metFORMIN (GLUCOPHAGE) 1000 MG tablet TAKE 1 TABLET TWICE A DAY WITH MEALS 3/22/22   Dixie Nose, DO   glyBURIDE (DIABETA) 5 MG tablet TAKE 1 TABLET EVERY MORNING AND 2 TABLETS EVERY EVENING WITH MEALS 3/22/22   Dixie Nose, DO   losartan (COZAAR) 100 MG tablet TAKE 1 TABLET DAILY 3/22/22   Luis Guerra Prescott, DO   amLODIPine (NORVASC) 5 MG tablet TAKE 1 TABLET DAILY 3/22/22   Arpit January, DO   JARDIANCE 10 MG tablet TAKE 1 TABLET DAILY 3/22/22   Arpit January, DO   pravastatin (PRAVACHOL) 40 MG tablet TAKE 1 TABLET DAILY 3/22/22   Arpit January, DO   Semaglutide (RYBELSUS) 14 MG TABS Take 1 tablet by mouth daily 3/18/22   Arpit January, DO   PARoxetine (PAXIL) 10 MG tablet TAKE 1 TABLET EVERY MORNING 2/14/22 Jonah January, DO   levothyroxine (SYNTHROID) 175 MCG tablet TAKE 1 TABLET DAILY 10/1/21   Arpit January, DO   hydroCHLOROthiazide (HYDRODIURIL) 25 MG tablet TAKE 2 TABLETS DAILY 6/28/21 Jonah January, DO   aspirin 81 MG EC tablet Take 81 mg by mouth nightly    Historical Provider, MD   montelukast (SINGULAIR) 10 MG tablet Take 10 mg by mouth daily 1/29/21   Historical Provider, MD   azelastine (ASTELIN) 0.1 % nasal spray 1 spray by Nasal route 2 times daily  1/14/21   Historical Provider, MD   fluticasone (FLONASE) 50 MCG/ACT nasal spray 1 spray by Nasal route 2 times daily 2/2/21   Historical Provider, MD       Allergies     Patient has no known allergies. Social History   TOBACCO:   reports that she has never smoked. She has never used smokeless tobacco.  ETOH:   reports no history of alcohol use. Family History   History reviewed. No pertinent family history. Review of Systems   Constitutional:  No weight loss, no night sweats  Eyes:  No visual changes  ENT:  No nasal drainage or ear pain  CV:  No chest pain  PULM:  No cough, no shortness of breath  GI:  No nausea, vomiting, diarrhea  :  No dysuria  Musc:  See HPI  Neuro: No numbness, tingling or parethesias  Skin:  No rashes  Psych: No depression symptoms    Physical Exam:    Vitals: Ht 5' 3\" (1.6 m)   Wt 300 lb (136.1 kg)   BMI 53.14 kg/m² ,  Body mass index is 53.14 kg/m².    General appearance: alert, appears stated age and cooperative  Skin: Skin color, texture, turgor normal. No rashes or lesions  HEENT: Head: Normocephalic, no lesions, without obvious abnormality. Neck: no adenopathy, no carotid bruit, no JVD, supple, symmetrical, trachea midline and thyroid not enlarged, symmetric, no tenderness/mass/nodules  Lungs: clear to auscultation bilaterally  Heart: regular rate and rhythm, S1, S2 normal, no murmur, click, rub or gallop  Abdomen: soft, non-tender; bowel sounds normal; no masses,  no organomegaly  Extremities: See office notes. Pain with moving to her shoulder. Tender to palpation anteriorly. Mild weakness to supraspinous muscle testing  Neurologic: Mental status: Alert, oriented, thought content appropriate    Labs     CBC:   Recent Labs     04/04/22  1432   WBC 9.7   HGB 17.0*        BMP:    Recent Labs     04/04/22  1432      K 3.8   CL 99   CO2 23   BUN 15   CREATININE 0.6   GLUCOSE 114*     INR: No results for input(s): INR in the last 72 hours. X-ray view   Imaging Review    Right shoulder MRI: Date - 03/01/2022. Test location - [facility]. Test results -   1. Motion-limited examination. 2. Severe tendinosis of the supraspinatus and infraspinatus tendons, with small partial-thickness tears, as described above. 3. Mild to moderate tendinosis of the subscapularis tendon. 4. Mild degenerative arthritis of the acromioclavicular joint with inferior spurring. This may contribute to internal impingement. 5. Small glenohumeral joint effusion. 6. Subacromial subdeltoid bursitis    Assessment and Plan       1. Right shoulder calcific Rotator Cuff Tendinitis with Impingement Syndrome  -Injection (5/1/17, 3/9/20 1/3/21)    Plan  1. Right shoulder arthroscopy        At this point she had continued chronic pain with regards to her shoulder    She has had 3 previous steroid injections and physical therapy. We discussed the role of repeat physical therapy versus arthroscopy and at this point she would like to have \"something done\" about it.     I have discussed the alternatives of continued nonoperative treatment, observation, therapy, and medication treatment versus shoulder surgery with the patient. The patient feels that conservative treatment has failed and wishes to proceed with surgical treatment. The planned surgical procedure includes exam under anesthesia. Anesthetic risks will be discussed with the patient by the anesthesia department. Arthroscopic evaluation will be planned of the shoulder joint. This would include a systematic exam of the internal structures inside the shoulder joint including but not limited to the articular surfaces of the glenohumeral joint, the labrum, biceps tendon, the intraarticular portion of the subscapularis tendon and rotator cuff, synovium, glenohumeral ligaments and evaluation for loose bodies. If there is tearing of the labrum that is deemed pathologic then repair or debridement may be performed. If the biceps tendon has significant fraying and damage then biceps tenotomy or mini-open tenodesis will be performed as this may be a pain generator. If there is a partial thickness tear of the rotator cuff, then arthroscopic procedure will be performed which may include debridement or repair of the rotator cuff, bursectomy and possibly subacromial decompression. If there is evidence of a full thickness rotator cuff tear then arthroscopic repair will be attempted. If there is a 50% or greater partial thickness tear then repair will be attempted. If there is less that 50% tearing of the rotator cuff then debridement will be performed. There may be a chance of a mini-open or formal open procedure to reattach the rotator cuff. Repair would include placement of anchors and sutures to secure the tendon to the bone. If the tear is too large, or the tissue is of such poor quality then it may be determined that the rotator cuff is irreparable and debridement and outlet smoothing may be performed.  If the joint surfaces of the glenohumeral joint show advanced degenerative changes, persistent pain related to the cartilage wear may persist after surgery. I have offered no guarantees that surgical intervention will completely eliminate pain, restore function, restore strength, restore motion. I have advised the patient that the recovery is long process in the order on months requiring postoperative physical therapy. The patient was counseled at length about the risks of jl Covid-19 during their perioperative period and any recovery window from their procedure. The patient was made aware that jl Covid-19  may worsen their prognosis for recovering from their procedure  and lend to a higher morbidity and/or mortality risk. All material risks, benefits, and reasonable alternatives including postponing the procedure were discussed. The patient does wish to proceed with the procedure at this time.           Bernie Wilkerson MD

## 2022-04-06 NOTE — ANESTHESIA PRE PROCEDURE
Department of Anesthesiology  Preprocedure Note       Name:  Makenna Ewing   Age:  65 y.o.  :  1960                                          MRN:  [id number]         Date:  2022      Surgeon: Alexandr Zayas):  Candido Cristobal MD    Procedure: Procedure(s):  RIGHT SHOULDER ARTHROSCOPY ROTATOR CUFF REPAIR SUBACROMIAL DECOMPRESSION    Medications prior to admission:   Prior to Admission medications    Medication Sig Start Date End Date Taking? Authorizing Provider   metFORMIN (GLUCOPHAGE) 1000 MG tablet TAKE 1 TABLET TWICE A DAY WITH MEALS 3/22/22   Carlus Poll, DO   glyBURIDE (DIABETA) 5 MG tablet TAKE 1 TABLET EVERY MORNING AND 2 TABLETS EVERY EVENING WITH MEALS 3/22/22   Carlus Poll, DO   losartan (COZAAR) 100 MG tablet TAKE 1 TABLET DAILY 3/22/22   Carlus Poll, DO   amLODIPine (NORVASC) 5 MG tablet TAKE 1 TABLET DAILY 3/22/22   Carlus Poll, DO   JARDIANCE 10 MG tablet TAKE 1 TABLET DAILY 3/22/22   Carlus Poll, DO   pravastatin (PRAVACHOL) 40 MG tablet TAKE 1 TABLET DAILY 3/22/22   Carlus Poll, DO   Semaglutide (RYBELSUS) 14 MG TABS Take 1 tablet by mouth daily 3/18/22   Carlus Poll, DO   PARoxetine (PAXIL) 10 MG tablet TAKE 1 TABLET EVERY MORNING 22   Carlus Poll, DO   levothyroxine (SYNTHROID) 175 MCG tablet TAKE 1 TABLET DAILY 10/1/21   Carlus Poll, DO   hydroCHLOROthiazide (HYDRODIURIL) 25 MG tablet TAKE 2 TABLETS DAILY 21   Carlus Poll, DO   aspirin 81 MG EC tablet Take 81 mg by mouth nightly    Historical Provider, MD   montelukast (SINGULAIR) 10 MG tablet Take 10 mg by mouth daily 21   Historical Provider, MD   azelastine (ASTELIN) 0.1 % nasal spray 1 spray by Nasal route 2 times daily  21   Historical Provider, MD   fluticasone (FLONASE) 50 MCG/ACT nasal spray 1 spray by Nasal route 2 times daily 21   Historical Provider, MD       Current medications:    No current facility-administered medications for this visit. No current outpatient medications on file. Facility-Administered Medications Ordered in Other Visits   Medication Dose Route Frequency Provider Last Rate Last Admin    lactated ringers infusion   IntraVENous Continuous Rudy MD Young 100 mL/hr at 04/06/22 1017 New Bag at 04/06/22 1017    meperidine (DEMEROL) injection 12.5 mg  12.5 mg IntraVENous Q5 Min PRN Ramu Mario DO        HYDROmorphone (DILAUDID) injection 0.5 mg  0.5 mg IntraVENous Q5 Min PRN Ramu Mario DO        fentaNYL (SUBLIMAZE) injection 50 mcg  50 mcg IntraVENous Q5 Min PRN Ramu Mario,         metoclopramide (REGLAN) injection 10 mg  10 mg IntraVENous Once PRN Ramu Mario, DO        prochlorperazine (COMPAZINE) 5 mg in sodium chloride (PF) 10 mL injection  5 mg IntraVENous Once PRN Ramu Mario, DO        diphenhydrAMINE (BENADRYL) injection 12.5 mg  12.5 mg IntraVENous Once PRN Ramu Mario, DO        labetalol (NORMODYNE;TRANDATE) injection 10 mg  10 mg IntraVENous Q15 Min PRN Ramu Mario DO        Or    hydrALAZINE (APRESOLINE) injection 10 mg  10 mg IntraVENous Q15 Min PRN Ramu Mario, DO        ipratropium-albuterol (DUONEB) nebulizer solution 1 ampule  1 ampule Inhalation Once PRN Ramu Mario DO           Allergies:  No Known Allergies    Problem List:    Patient Active Problem List   Diagnosis Code    Type 2 diabetes mellitus with hyperglycemia, without long-term current use of insulin ([facility]) E11.65    Essential hypertension I10    Hypothyroidism E03.9    Hyperlipidemia E78.5    Fullness of supraclavicular fossa R22.2    Chronic rhinitis J31.0       Past Medical History:        Diagnosis Date    Allergic rhinitis     Anemia     Chronic rhinitis     [facility] mellitus [facility]     History of COVID-19     Hyperlipidemia     Hypertension     Hypothyroid     Morbid obesity ([facility]     Osteoarthrosis     Tricompartmental arthritis of the left knee    Radiculopathy, cervical region     Sensorimotor neuropathy 2014    low grade of Upper Extremities    Stricture and stenosis of cervix uteri        Past Surgical History:        Procedure Laterality Date    BACK SURGERY       SECTION      ENDOMETRIAL ABLATION         Social History:    Social History     Tobacco Use    Smoking status: Never Smoker    Smokeless tobacco: Never Used   Substance Use Topics    Alcohol use: No                                Counseling given: Not Answered      Vital Signs (Current): There were no vitals filed for this visit.                                            BP Readings from Last 3 Encounters:   22 (!) 166/100   02/15/22 128/70   10/01/21 130/89       NPO Status:                                                                                 BMI:   Wt Readings from Last 3 Encounters:   22 300 lb (136.1 kg)   02/15/22 (!) 302 lb 9.6 oz (137.3 kg)   10/01/21 (!) 312 lb 6.4 oz (141.7 kg)     There is no height or weight on file to calculate BMI.    CBC:   Lab Results   Component Value Date    WBC 9.7 2022    RBC 5.57 2022    HGB 17.0 2022    HCT 50.7 2022    MCV 91.0 2022    RDW 13.6 2022     2022       CMP:   Lab Results   Component Value Date     2022    K 3.8 2022    CL 99 2022    CO2 23 2022    BUN 15 2022    CREATININE 0.6 2022    GFRAA >60 2022    AGRATIO 1.4 2021    LABGLOM >60 2022    GLUCOSE 114 2022    PROT 7.6 2021    PROT 7.5 2019    CALCIUM 10.3 2022    BILITOT 0.8 2021    ALKPHOS 73 2021    AST 26 2021    ALT 33 2021       POC Tests:   Recent Labs     22  1019   POCGLU 162*       Coags:   Lab Results   Component Value Date    PROTIME 10.9 2013    PROTIME 11.4 2011    INR 1.01 2013    APTT 27.2 2013       HCG (If Applicable): No results found for: PREGTESTUR, PREGSERUM, HCG, HCGQUANT     ABGs: No results found for: PHART, PO2ART, DCC0ZFB, ANS7QAL, BEART, V3XXOLMM     Type & Screen (If Applicable):  No results found for: LABABO, LABRH    Drug/Infectious Status (If Applicable):  No results found for: HIV, HEPCAB    COVID-19 Screening (If Applicable): No results found for: COVID19        Anesthesia Evaluation    Airway: Mallampati: III        Dental:          Pulmonary:                              Cardiovascular:    (+) hypertension:,                   Neuro/Psych:   (+) neuromuscular disease:,             GI/Hepatic/Renal:   (+) morbid obesity          Endo/Other:    (+) Diabetes, hypothyroidism::., .                 Abdominal:             Vascular: Other Findings:               Anesthesia Plan      general and regional     ASA 3     (Chart review)  Induction: intravenous.                           NEETA Jeffrey - MAREN   4/6/2022

## 2022-04-06 NOTE — ANESTHESIA PROCEDURE NOTES
Peripheral Block    Patient location during procedure: pre-op  Start time: 4/6/2022 11:38 AM  End time: 4/6/2022 11:46 AM  Staffing  Performed: anesthesiologist   Anesthesiologist: Sy Henderson DO  Preanesthetic Checklist  Completed: patient identified, IV checked, site marked, risks and benefits discussed, surgical consent, monitors and equipment checked, pre-op evaluation, timeout performed, anesthesia consent given, oxygen available and patient being monitored  Peripheral Block  Patient position: sitting  Prep: ChloraPrep  Patient monitoring: cardiac monitor, continuous pulse ox, frequent blood pressure checks and IV access  Block type: Brachial plexus  Laterality: right  Injection technique: single-shot  Guidance: ultrasound guided  Local infiltration: lidocaine  Infiltration strength: 1 %  Dose: 3 mL  Interscalene  Provider prep: mask and sterile gloves  Local infiltration: lidocaine  Needle  Needle type: combined needle/nerve stimulator   Needle gauge: 22 G  Needle length: 5 cm  Needle localization: ultrasound guidance  Assessment  Injection assessment: negative aspiration for heme, no paresthesia on injection and local visualized surrounding nerve on ultrasound  Paresthesia pain: none  Slow fractionated injection: yes  Hemodynamics: stable  Additional Notes  U/S [id number].  (1) Under ultrasound guidance, a 22 gauge needle was inserted and placed in close proximity to the brachial plexus.  (2) Ultrasound was also used to visualize the spread of the anesthetic in close proximity to the nerve being blocked. (3) The nerve appeared anatomically normal, and (4 there were no apparent abnormal pathological findings on the image that were readily visible and related to the nerve being blocked. (5) A permanent ultrasound image was saved in the patient's record.             Medications Administered  Ropivacaine (NAROPIN) injection 0.5%, 20 mL  Reason for block: post-op pain management and at surgeon's request

## 2022-04-07 NOTE — OP NOTE
[address] [place] [address], [address] [place] [address]                                OPERATIVE REPORT    PATIENT NAME: Shaye Fernando                       :        1960  MED REC NO:   [id number]                          ROOM:  ACCOUNT NO:   [de-identified]                           ADMIT DATE: 2022  PROVIDER:     Lavonne Ball. Annalise Wang MD    DATE OF PROCEDURE:  2022    PREOPERATIVE DIAGNOSES:  1. Right shoulder rotator cuff tear. 2.  Impingement syndrome. POSTOPERATIVE DIAGNOSES:  1. Right shoulder rotator cuff tear. 2.  Partial thickness subscapularis tendon with severe biceps  tendinopathy. 3.  Impingement syndrome with bursitis. 4.  Degenerative labral fraying. OPERATION PERFORMED:  1. Right shoulder arthroscopy with rotator cuff repair. 2.  Right shoulder arthroscopy with intraarticular debridement with  biceps tenotomy. 3.  Subacromial decompression. SURGEON:  Lavonne Wang MD    ASSISTANT:  None. ANESTHESIA:  General endotracheal with interscalene block. ESTIMATED BLOOD LOSS:  Minimal.    DRAINS:  None. SPECIMENS:  None. BRIEF HISTORY/INDICATION FOR PROCEDURE:  The patient is a 60-year-old  female with continued ongoing pain with regards to her right shoulder. She failed conservative treatment and she wished to proceed with  operative intervention. She was explained the risks and benefits of  surgical intervention as outlined in the office notes. DESCRIPTION OF PROCEDURE:  The patient was brought back to the operating  room and placed supine on the operating table. Once under general  endotracheal anesthesia and preemptive interscalene block, the patient  was then placed in beach chair position. Time-out was then performed  confirming the patient's name, operative site, proposed procedure, known  allergies, and administration of antibiotics. Once all in agreement,  the procedure was started.   First, a posterior portal was established  followed by an anterior, anterosuperior, and lateral portal.  The  arthroscope was inserted inside the shoulder and a systemic examination  of the shoulder was undertaken. Examination of the glenohumeral joint  revealed no evidence of degenerative changes seen on the glenoid face or  humeral head. Visualizing anteriorly, there was partial-thickness  fraying seen in the subscapularis tendon. This was debrided back to a  smooth and stable surface. Synovitis was identified in the rotator cuff  interval.  Biceps tendon had severe tendinopathy with some medial  subluxation. There was flattening seen as well. There was degenerative  labral fraying in the Pari complex that was identified. No  chondromalacia was seen on the humeral head or glenoid face. We then  performed biceps tenotomy allowing to track _____ groove. We also  debrided the labrum from anterior to posterior direction as some of it  was extruded into the joint. We then visualized laterally with evidence  of essentially full-thickness tear of the supraspinatus tendon. Few  fibers remained; however, it was portion of the anterior aspect of the  rotator cuff. We then entered subacromial space with significant amount  of subacromial bursitis identified. Bursectomy was then performed  allowing us to visualize the rotator cuff. All soft tissue off the  greater tuberosity was removed with a motorized shaver and  electrocautery. Once the soft tissue was removed, we then placed an  Arthrex  5.5 [employer] corkscrew anchor in the greater tuberosity. Suture limbs were then passed through the rotator cuff using suture  lasso. We tied the anterior stitch first reattaching the supraspinatus  tendon back to the greater tuberosity. We then tied the posterior one. There was good compression and good stability after the repair.   We then  placed the arm through internal and external rotation and again there  was good stability. No further tears were identified. We then used a  motorized shaver as a ronaldo and removed a portion of anterior lateral  aspect of the acromion _____ type 2 to type 1 acromion. Satisfied with  this, we then withdrew the arthroscope. Portal sites were closed with  3-0 nylon stitch. Sterile dressing was applied and the patient was  brought back to the recovery room in stable condition.         Lauren Bishop MD    D: 04/06/2022 13:38:17       T: 04/07/2022 1:22:37     [email or web address]  Job#: [id number]     Doc#: [id number]    CC:

## 2022-06-02 DIAGNOSIS — E03.9 HYPOTHYROIDISM, UNSPECIFIED TYPE: ICD-10-CM

## 2022-06-02 RX ORDER — LEVOTHYROXINE SODIUM 175 UG/1
TABLET ORAL
Qty: 90 TABLET | Refills: 0 | Status: SHIPPED | OUTPATIENT
Start: 2022-06-02 | End: 2022-09-07

## 2022-06-10 LAB
A/G RATIO: 1.7 (ref 1.2–2.2)
ALBUMIN SERPL-MCNC: 4.7 G/DL (ref 3.8–4.8)
ALP BLD-CCNC: 74 IU/L (ref 44–121)
ALT SERPL-CCNC: 47 IU/L (ref 0–32)
AMBIGUOUS ABBREVIATION: NORMAL
AMBIGUOUS ABBREVIATION: NORMAL
AST SERPL-CCNC: 38 IU/L (ref 0–40)
BASOPHILS ABSOLUTE: 0.1 X10E3/UL (ref 0–0.2)
BASOPHILS RELATIVE PERCENT: 1 %
BILIRUB SERPL-MCNC: 0.7 MG/DL (ref 0–1.2)
BUN / CREAT RATIO: 25 (ref 12–28)
BUN BLDV-MCNC: 18 MG/DL (ref 8–27)
CALCIUM SERPL-MCNC: 9.9 MG/DL (ref 8.7–10.3)
CHLORIDE BLD-SCNC: 97 MMOL/L (ref 96–106)
CHOLESTEROL, TOTAL: 168 MG/DL (ref 100–199)
CO2: 21 MMOL/L (ref 20–29)
COMMENT: ABNORMAL
CREAT SERPL-MCNC: 0.73 MG/DL (ref 0.57–1)
EOSINOPHILS ABSOLUTE: 0.2 X10E3/UL (ref 0–0.4)
EOSINOPHILS RELATIVE PERCENT: 3 %
ERYTHROCYTES, NUCLEATED/100 LEU: ABNORMAL
ESTIMATED GLOMERULAR FILTRATION RATE CREATININE EQUATION: 94 ML/MIN/1.73
GLOBULIN: 2.8 G/DL (ref 1.5–4.5)
GLUCOSE BLD-MCNC: 204 MG/DL (ref 65–99)
HBA1C MFR BLD: 7.9 % (ref 4.8–5.6)
HCT VFR BLD CALC: 48.5 % (ref 34–46.6)
HDLC SERPL-MCNC: 37 MG/DL
HEMOGLOBIN: 16.4 G/DL (ref 11.1–15.9)
IMMATURE CELLS ABSOLUTE COUNT: ABNORMAL
IMMATURE GRANS (ABS): 0 X10E3/UL (ref 0–0.1)
IMMATURE GRANULOCYTES: 1 %
LDL CHOLESTEROL CALCULATED: 86 MG/DL (ref 0–99)
LYMPHOCYTES ABSOLUTE: 1.9 X10E3/UL (ref 0.7–3.1)
LYMPHOCYTES RELATIVE PERCENT: 30 %
MCH RBC QN AUTO: 30.5 PG (ref 26.6–33)
MCHC RBC AUTO-ENTMCNC: 33.8 G/DL (ref 31.5–35.7)
MCV RBC AUTO: 90 FL (ref 79–97)
MONOCYTES ABSOLUTE: 0.5 X10E3/UL (ref 0.1–0.9)
MONOCYTES RELATIVE PERCENT: 9 %
MORPHOLOGY: ABNORMAL
NEUTROPHILS ABSOLUTE: 3.5 X10E3/UL (ref 1.4–7)
PDW BLD-RTO: 12.7 % (ref 11.7–15.4)
PLATELET # BLD: 211 X10E3/UL (ref 150–450)
POTASSIUM SERPL-SCNC: 3.8 MMOL/L (ref 3.5–5.2)
RBC # BLD: 5.38 X10E6/UL (ref 3.77–5.28)
SEGMENTED NEUTROPHILS RELATIVE PERCENT: 56 %
SODIUM BLD-SCNC: 139 MMOL/L (ref 134–144)
TOTAL PROTEIN: 7.5 G/DL (ref 6–8.5)
TRIGL SERPL-MCNC: 269 MG/DL (ref 0–149)
VLDLC SERPL CALC-MCNC: 45 MG/DL (ref 5–40)
WBC # BLD: 6.2 X10E3/UL (ref 3.4–10.8)

## 2022-06-15 ENCOUNTER — OFFICE VISIT (OUTPATIENT)
Dept: INTERNAL MEDICINE CLINIC | Age: 62
End: 2022-06-15
Payer: COMMERCIAL

## 2022-06-15 VITALS
HEIGHT: 63 IN | HEART RATE: 94 BPM | SYSTOLIC BLOOD PRESSURE: 120 MMHG | DIASTOLIC BLOOD PRESSURE: 74 MMHG | OXYGEN SATURATION: 95 % | BODY MASS INDEX: 51.91 KG/M2 | WEIGHT: 293 LBS

## 2022-06-15 DIAGNOSIS — I10 ESSENTIAL HYPERTENSION: ICD-10-CM

## 2022-06-15 DIAGNOSIS — E78.5 HYPERLIPIDEMIA, UNSPECIFIED HYPERLIPIDEMIA TYPE: ICD-10-CM

## 2022-06-15 DIAGNOSIS — E11.65 TYPE 2 DIABETES MELLITUS WITH HYPERGLYCEMIA, WITHOUT LONG-TERM CURRENT USE OF INSULIN (HCC): Primary | ICD-10-CM

## 2022-06-15 DIAGNOSIS — E03.9 HYPOTHYROIDISM, UNSPECIFIED TYPE: ICD-10-CM

## 2022-06-15 DIAGNOSIS — D75.1 ERYTHROCYTOSIS: ICD-10-CM

## 2022-06-15 DIAGNOSIS — Z79.899 LONG TERM USE OF DRUG: ICD-10-CM

## 2022-06-15 PROCEDURE — 99214 OFFICE O/P EST MOD 30 MIN: CPT | Performed by: FAMILY MEDICINE

## 2022-06-15 PROCEDURE — 3051F HG A1C>EQUAL 7.0%<8.0%: CPT | Performed by: FAMILY MEDICINE

## 2022-06-15 SDOH — ECONOMIC STABILITY: FOOD INSECURITY: WITHIN THE PAST 12 MONTHS, YOU WORRIED THAT YOUR FOOD WOULD RUN OUT BEFORE YOU GOT MONEY TO BUY MORE.: NEVER TRUE

## 2022-06-15 SDOH — ECONOMIC STABILITY: FOOD INSECURITY: WITHIN THE PAST 12 MONTHS, THE FOOD YOU BOUGHT JUST DIDN'T LAST AND YOU DIDN'T HAVE MONEY TO GET MORE.: NEVER TRUE

## 2022-06-15 ASSESSMENT — SOCIAL DETERMINANTS OF HEALTH (SDOH): HOW HARD IS IT FOR YOU TO PAY FOR THE VERY BASICS LIKE FOOD, HOUSING, MEDICAL CARE, AND HEATING?: NOT HARD AT ALL

## 2022-06-15 ASSESSMENT — PATIENT HEALTH QUESTIONNAIRE - PHQ9
2. FEELING DOWN, DEPRESSED OR HOPELESS: 0
SUM OF ALL RESPONSES TO PHQ9 QUESTIONS 1 & 2: 0
SUM OF ALL RESPONSES TO PHQ QUESTIONS 1-9: 0
SUM OF ALL RESPONSES TO PHQ QUESTIONS 1-9: 0
1. LITTLE INTEREST OR PLEASURE IN DOING THINGS: 0
SUM OF ALL RESPONSES TO PHQ QUESTIONS 1-9: 0
SUM OF ALL RESPONSES TO PHQ QUESTIONS 1-9: 0

## 2022-06-15 ASSESSMENT — ENCOUNTER SYMPTOMS
SHORTNESS OF BREATH: 0
ABDOMINAL PAIN: 0
COUGH: 0
NAUSEA: 0

## 2022-06-15 NOTE — PROGRESS NOTES
Component Value Date     06/09/2022    K 3.8 06/09/2022    CL 97 06/09/2022    CO2 21 06/09/2022    BUN 18 06/09/2022    CREATININE 0.73 06/09/2022    GLUCOSE 204 (H) 06/09/2022    CALCIUM 9.9 06/09/2022    PROT 7.5 06/09/2022    LABALBU 4.7 06/09/2022    BILITOT 0.7 06/09/2022    ALKPHOS 74 06/09/2022    AST 38 06/09/2022    ALT 47 (H) 06/09/2022    LABGLOM >60 04/04/2022    GFRAA >60 04/04/2022    AGRATIO 1.7 06/09/2022    GLOB 2.8 06/09/2022       Lab Results   Component Value Date    WBC 6.2 06/09/2022    HGB 16.4 (H) 06/09/2022    HCT 48.5 (H) 06/09/2022    MCV 90 06/09/2022     06/09/2022         Subjective   SUBJECTIVE/OBJECTIVE:  HPI: This  64 yo F here for the following:  Patient Active Problem List    Diagnosis Date Noted    Chronic rhinitis     Fullness of supraclavicular fossa 08/19/2020    Type 2 diabetes mellitus with hyperglycemia, without long-term current use of insulin ([facility] [address] 06/09/2020    Essential hypertension 06/09/2020    Hypothyroidism 06/09/2020    Hyperlipidemia 06/09/2020       S/P Right shoulder surgery( arthroscopy and RTC repair and subacromial decompression)  4/6/22- she is in P.T  DM II- Hba1c 7.9. improved but not controlled  HLD- TG elevated. Pt will start to make changes  Hypothyroidism- On Levothyroxine 175  HTN- stable on medication  Patient had cellulitis to LLE. This has improved  Slightly elevated H/H on lab    Review of Systems   Constitutional: Negative for diaphoresis and fever. Respiratory: Negative for cough and shortness of breath. Cardiovascular: Negative for chest pain and palpitations. Gastrointestinal: Negative for abdominal pain and nausea. Genitourinary: Negative for difficulty urinating. Musculoskeletal: Positive for arthralgias (R shoulder- improving). Neurological: Negative for dizziness and headaches.        No Known Allergies  Current Outpatient Medications   Medication Sig Dispense Refill    levothyroxine (SYNTHROID) 175 MCG tablet TAKE 1 TABLET DAILY 90 tablet 0    metFORMIN (GLUCOPHAGE) 1000 MG tablet TAKE 1 TABLET TWICE A DAY WITH MEALS 180 tablet 1    glyBURIDE (DIABETA) 5 MG tablet TAKE 1 TABLET EVERY MORNING AND 2 TABLETS EVERY EVENING WITH MEALS 270 tablet 1    losartan (COZAAR) 100 MG tablet TAKE 1 TABLET DAILY 90 tablet 1    amLODIPine (NORVASC) 5 MG tablet TAKE 1 TABLET DAILY 90 tablet 1    JARDIANCE 10 MG tablet TAKE 1 TABLET DAILY 90 tablet 1    pravastatin (PRAVACHOL) 40 MG tablet TAKE 1 TABLET DAILY 90 tablet 1    Semaglutide (RYBELSUS) 14 MG TABS Take 1 tablet by mouth daily 90 tablet 1    PARoxetine (PAXIL) 10 MG tablet TAKE 1 TABLET EVERY MORNING 90 tablet 1    hydroCHLOROthiazide (HYDRODIURIL) 25 MG tablet TAKE 2 TABLETS DAILY 180 tablet 1    aspirin 81 MG EC tablet Take 81 mg by mouth nightly      montelukast (SINGULAIR) 10 MG tablet Take 10 mg by mouth daily      azelastine (ASTELIN) 0.1 % nasal spray 1 spray by Nasal route 2 times daily       fluticasone (FLONASE) 50 MCG/ACT nasal spray 1 spray by Nasal route 2 times daily       No current facility-administered medications for this visit. Vitals:    06/15/22 0908   BP: 120/74   Site: Right Lower Arm   Position: Sitting   Cuff Size: Medium Adult   Pulse: 94   SpO2: 95%   Weight: (!) 302 lb (137 kg)   Height: 5' 3\" (1.6 m)     Objective   Physical Exam  Vitals reviewed. Constitutional:       General: She is not in acute distress. Eyes:      General: No scleral icterus. Extraocular Movements: Extraocular movements intact. Cardiovascular:      Rate and Rhythm: Normal rate and regular rhythm. Pulmonary:      Effort: Pulmonary effort is normal. No respiratory distress. Breath sounds: Normal breath sounds. Abdominal:      Palpations: Abdomen is soft. Tenderness: There is no abdominal tenderness. Musculoskeletal:      Cervical back: Neck supple. Right lower leg: No edema. Left lower leg: No edema.    Neurological: Mental Status: She is alert and oriented to person, place, and time. Psychiatric:         Mood and Affect: Mood normal.                An electronic signature was used to authenticate this note.     --Will High, DO

## 2022-06-20 ENCOUNTER — HOSPITAL ENCOUNTER (OUTPATIENT)
Dept: WOMENS IMAGING | Age: 62
Discharge: HOME | End: 2022-06-20
Payer: COMMERCIAL

## 2022-06-20 DIAGNOSIS — Z12.31 BREAST CANCER SCREENING BY MAMMOGRAM: ICD-10-CM

## 2022-06-20 PROCEDURE — 77067 SCR MAMMO BI INCL CAD: CPT

## 2022-08-02 RX ORDER — HYDROCHLOROTHIAZIDE 25 MG/1
TABLET ORAL
Qty: 180 TABLET | Refills: 1 | Status: SHIPPED | OUTPATIENT
Start: 2022-08-02

## 2022-08-18 RX ORDER — PAROXETINE 10 MG/1
TABLET, FILM COATED ORAL
Qty: 90 TABLET | Refills: 1 | Status: SHIPPED | OUTPATIENT
Start: 2022-08-18

## 2022-09-05 DIAGNOSIS — E03.9 HYPOTHYROIDISM, UNSPECIFIED TYPE: ICD-10-CM

## 2022-09-05 DIAGNOSIS — I10 ESSENTIAL HYPERTENSION: ICD-10-CM

## 2022-09-05 DIAGNOSIS — E11.65 TYPE 2 DIABETES MELLITUS WITH HYPERGLYCEMIA, WITHOUT LONG-TERM CURRENT USE OF INSULIN (HCC): ICD-10-CM

## 2022-09-07 RX ORDER — LEVOTHYROXINE SODIUM 175 UG/1
TABLET ORAL
Qty: 90 TABLET | Refills: 1 | Status: SHIPPED | OUTPATIENT
Start: 2022-09-07

## 2022-09-07 RX ORDER — EMPAGLIFLOZIN 10 MG/1
TABLET, FILM COATED ORAL
Qty: 90 TABLET | Refills: 1 | Status: SHIPPED | OUTPATIENT
Start: 2022-09-07

## 2022-09-07 RX ORDER — PRAVASTATIN SODIUM 40 MG
TABLET ORAL
Qty: 90 TABLET | Refills: 1 | Status: SHIPPED | OUTPATIENT
Start: 2022-09-07

## 2022-09-07 RX ORDER — AMLODIPINE BESYLATE 5 MG/1
TABLET ORAL
Qty: 90 TABLET | Refills: 1 | Status: SHIPPED | OUTPATIENT
Start: 2022-09-07

## 2022-09-07 RX ORDER — LOSARTAN POTASSIUM 100 MG/1
TABLET ORAL
Qty: 90 TABLET | Refills: 1 | Status: SHIPPED | OUTPATIENT
Start: 2022-09-07

## 2022-09-07 RX ORDER — ORAL SEMAGLUTIDE 14 MG/1
TABLET ORAL
Qty: 90 TABLET | Refills: 1 | Status: SHIPPED | OUTPATIENT
Start: 2022-09-07

## 2022-09-07 RX ORDER — GLYBURIDE 5 MG/1
TABLET ORAL
Qty: 270 TABLET | Refills: 1 | Status: SHIPPED | OUTPATIENT
Start: 2022-09-07

## 2022-09-30 ENCOUNTER — COMMUNITY OUTREACH (OUTPATIENT)
Dept: INTERNAL MEDICINE CLINIC | Age: 62
End: 2022-09-30

## 2022-09-30 NOTE — PROGRESS NOTES
Patient's HM shows they are current for Colorectal Screening and Mammogram Screening. [employer] and  files searched with success. Results attached to order and HM updated.     Patient is overdue for Cervical Cancer Screen - Will F/U with  to see if PT has recent screen

## 2022-10-13 LAB — PAP SMEAR, EXTERNAL: NORMAL

## 2022-10-14 LAB
ALBUMIN SERPL-MCNC: 4.7 G/DL (ref 3.8–4.8)
ALP BLD-CCNC: 71 IU/L (ref 44–121)
ALT SERPL-CCNC: 35 IU/L (ref 0–32)
AST SERPL-CCNC: 32 IU/L (ref 0–40)
BASOPHILS ABSOLUTE: 0.1 X10E3/UL (ref 0–0.2)
BASOPHILS RELATIVE PERCENT: 1 %
BILIRUB SERPL-MCNC: 1.3 MG/DL (ref 0–1.2)
BILIRUBIN DIRECT: 0.34 MG/DL (ref 0–0.4)
CHOLESTEROL, TOTAL: 161 MG/DL (ref 100–199)
COMMENT: ABNORMAL
CREATININE URINE: 60.4 MG/DL
EOSINOPHILS ABSOLUTE: 0.2 X10E3/UL (ref 0–0.4)
EOSINOPHILS RELATIVE PERCENT: 3 %
ERYTHROCYTES, NUCLEATED/100 LEU: ABNORMAL
HBA1C MFR BLD: 8.1 % (ref 4.8–5.6)
HCT VFR BLD CALC: 51.8 % (ref 34–46.6)
HDLC SERPL-MCNC: 44 MG/DL
HEMOGLOBIN: 16.8 G/DL (ref 11.1–15.9)
IMMATURE CELLS ABSOLUTE COUNT: ABNORMAL
IMMATURE GRANS (ABS): 0 X10E3/UL (ref 0–0.1)
IMMATURE GRANULOCYTES: 1 %
LDL CHOLESTEROL CALCULATED: 86 MG/DL (ref 0–99)
LYMPHOCYTES ABSOLUTE: 2.1 X10E3/UL (ref 0.7–3.1)
LYMPHOCYTES RELATIVE PERCENT: 28 %
MCH RBC QN AUTO: 29.1 PG (ref 26.6–33)
MCHC RBC AUTO-ENTMCNC: 32.4 G/DL (ref 31.5–35.7)
MCV RBC AUTO: 90 FL (ref 79–97)
MICROALBUMIN UR-MCNC: 20.4 UG/ML
MICROALBUMIN/CREAT UR-RTO: 34 MG/G CREAT (ref 0–29)
MONOCYTES ABSOLUTE: 0.6 X10E3/UL (ref 0.1–0.9)
MONOCYTES RELATIVE PERCENT: 8 %
MORPHOLOGY: ABNORMAL
NEUTROPHILS ABSOLUTE: 4.3 X10E3/UL (ref 1.4–7)
PDW BLD-RTO: 13.1 % (ref 11.7–15.4)
PLATELET # BLD: 211 X10E3/UL (ref 150–450)
RBC # BLD: 5.78 X10E6/UL (ref 3.77–5.28)
SEGMENTED NEUTROPHILS RELATIVE PERCENT: 59 %
T4 FREE: 1.51 NG/DL (ref 0.82–1.77)
TOTAL PROTEIN: 7.7 G/DL (ref 6–8.5)
TRIGL SERPL-MCNC: 182 MG/DL (ref 0–149)
TSH SERPL DL<=0.05 MIU/L-ACNC: 3.32 UIU/ML (ref 0.45–4.5)
VLDLC SERPL CALC-MCNC: 31 MG/DL (ref 5–40)
WBC # BLD: 7.3 X10E3/UL (ref 3.4–10.8)

## 2022-10-18 ENCOUNTER — OFFICE VISIT (OUTPATIENT)
Dept: INTERNAL MEDICINE CLINIC | Age: 62
End: 2022-10-18
Payer: COMMERCIAL

## 2022-10-18 VITALS
SYSTOLIC BLOOD PRESSURE: 139 MMHG | HEART RATE: 84 BPM | BODY MASS INDEX: 51.91 KG/M2 | DIASTOLIC BLOOD PRESSURE: 89 MMHG | WEIGHT: 293 LBS | HEIGHT: 63 IN | OXYGEN SATURATION: 98 %

## 2022-10-18 DIAGNOSIS — D75.1 ERYTHROCYTOSIS: ICD-10-CM

## 2022-10-18 DIAGNOSIS — E03.9 HYPOTHYROIDISM, UNSPECIFIED TYPE: ICD-10-CM

## 2022-10-18 DIAGNOSIS — R42 EPISODE OF DIZZINESS: ICD-10-CM

## 2022-10-18 DIAGNOSIS — E11.65 TYPE 2 DIABETES MELLITUS WITH HYPERGLYCEMIA, WITHOUT LONG-TERM CURRENT USE OF INSULIN (HCC): Primary | ICD-10-CM

## 2022-10-18 DIAGNOSIS — I10 ESSENTIAL HYPERTENSION: ICD-10-CM

## 2022-10-18 DIAGNOSIS — E78.5 HYPERLIPIDEMIA, UNSPECIFIED HYPERLIPIDEMIA TYPE: ICD-10-CM

## 2022-10-18 PROCEDURE — 3052F HG A1C>EQUAL 8.0%<EQUAL 9.0%: CPT | Performed by: FAMILY MEDICINE

## 2022-10-18 PROCEDURE — 99214 OFFICE O/P EST MOD 30 MIN: CPT | Performed by: FAMILY MEDICINE

## 2022-10-18 ASSESSMENT — ENCOUNTER SYMPTOMS
COUGH: 0
NAUSEA: 0
SHORTNESS OF BREATH: 0
BACK PAIN: 0
ABDOMINAL PAIN: 0

## 2022-10-18 NOTE — PROGRESS NOTES
Jinny Michelle (:  1960) is a 65 y.o. female,established patient, here for evaluation of the following chief complaint(s):  Follow-up, Diabetes, Hypertension, and Hyperlipidemia         ASSESSMENT/PLAN:  1. Type 2 diabetes mellitus with hyperglycemia, without long-term current use of insulin (HCC)  Continue metformin, glyburide, Rybelsus and Jardiance  - Hemoglobin A1C; Future    2. Erythrocytosis, persistent  - Lawson Jimenez MD, Hematology Oncology, [facility]    3. Essential hypertension  Continue losartan, Norvasc and HCTZ    4. Hypothyroidism, unspecified type  Continue levothyroxine    5. Hyperlipidemia, unspecified hyperlipidemia type  Continue pravastatin    6. Episode of dizziness  She would like to monitor symptoms  Persist RTO or call  On this date 10/18/2022 I have spent 30 minutes reviewing previous notes, test results and face to face with the patient discussing the diagnosis and importance of compliance with the treatment plan as well as documenting on the day of the visit. Return in about 4 months (around 2023) for Diabetes, HTN.        Lab Results   Component Value Date    WBC 7.3 10/13/2022    HGB 16.8 (H) 10/13/2022    HCT 51.8 (H) 10/13/2022    MCV 90 10/13/2022     10/13/2022     Lab Results   Component Value Date    CHOL 161 10/13/2022     Lab Results   Component Value Date    TRIG 182 (H) 10/13/2022     Lab Results   Component Value Date    HDL 44 10/13/2022     Lab Results   Component Value Date    LDLCALC 86 10/13/2022    LDLDIRECT 126 (H) 2011     Lab Results   Component Value Date    LABA1C 8.1 (H) 10/13/2022     Lab Results   Component Value Date     01/10/2013     Lab Results   Component Value Date     2022    K 3.8 2022    CL 97 2022    CO2 21 2022    BUN 18 2022    CREATININE 0.73 2022    GLUCOSE 204 (H) 2022    CALCIUM 9.9 2022    PROT 7.7 10/13/2022    LABALBU 4.7 10/13/2022 BILITOT 1.3 (H) 10/13/2022    ALKPHOS 71 10/13/2022    AST 32 10/13/2022    ALT 35 (H) 10/13/2022    LABGLOM >60 04/04/2022    GFRAA >60 04/04/2022    AGRATIO 1.7 06/09/2022    GLOB 2.8 06/09/2022     Lab Results   Component Value Date    TSH 3.320 10/13/2022     Lab Results   Component Value Date/Time    COLORU LT.YELLOW 12/29/2013 11:43 AM    LABPH 7.0 12/29/2013 11:43 AM    NITRU NEGATIVE 12/29/2013 11:43 AM    KETUA NEGATIVE 12/29/2013 11:43 AM    UROBILINOGEN 0.2 12/29/2013 11:43 AM    BILIRUBINUR NEGATIVE 12/29/2013 11:43 AM       Subjective   SUBJECTIVE/OBJECTIVE:    HISTORY OF PRESENT ILLNESS:  This is a 65 y.o. female here for the following:  Patient Active Problem List    Diagnosis Date Noted    Chronic rhinitis     Fullness of supraclavicular fossa 08/19/2020    Type 2 diabetes mellitus with hyperglycemia, without long-term current use of insulin ([facility] [address] 06/09/2020    Essential hypertension 06/09/2020    Hypothyroidism 06/09/2020    Hyperlipidemia 06/09/2020      Head can feel like it is swimming in the morning the last couple of weeks. She feels it could be her sinuses. No other symptoms  DM II- not controlled. Hba1c 8.1 slightly higher in spite of her losing weight  HTN - Pt did not take BP medication yet. She was using Norvasc qod. On losartan and HCTZ  HLD- slightly elevated. On pravastatin  Hypothyroidism  Erythrocytosis- patient willing to see hematology now  Pap smears with Dr. Michela Rivas    Review of Systems   Constitutional:  Negative for diaphoresis and fever. Respiratory:  Negative for cough and shortness of breath. Cardiovascular:  Negative for chest pain and palpitations. Gastrointestinal:  Negative for abdominal pain and nausea. Genitourinary:  Negative for difficulty urinating. Musculoskeletal:  Negative for back pain. Neurological:  Positive for dizziness (occasional spinning/swimming). Negative for headaches.      No Known Allergies  Current Outpatient Medications   Medication Sig Dispense Refill    metFORMIN (GLUCOPHAGE) 1000 MG tablet TAKE 1 TABLET TWICE A DAY WITH MEALS 180 tablet 1    glyBURIDE (DIABETA) 5 MG tablet TAKE 1 TABLET EVERY MORNING AND 2 TABLETS EVERY EVENING WITH MEALS 270 tablet 1    losartan (COZAAR) 100 MG tablet TAKE 1 TABLET DAILY 90 tablet 1    RYBELSUS 14 MG TABS TAKE 1 TABLET DAILY 90 tablet 1    pravastatin (PRAVACHOL) 40 MG tablet TAKE 1 TABLET DAILY 90 tablet 1    JARDIANCE 10 MG tablet TAKE 1 TABLET DAILY 90 tablet 1    amLODIPine (NORVASC) 5 MG tablet TAKE 1 TABLET DAILY (Patient taking differently: 2.5 mg) 90 tablet 1    levothyroxine (SYNTHROID) 175 MCG tablet TAKE 1 TABLET DAILY 90 tablet 1    PARoxetine (PAXIL) 10 MG tablet TAKE 1 TABLET EVERY MORNING 90 tablet 1    hydroCHLOROthiazide (HYDRODIURIL) 25 MG tablet TAKE 2 TABLETS DAILY 180 tablet 1    aspirin 81 MG EC tablet Take 81 mg by mouth nightly      montelukast (SINGULAIR) 10 MG tablet Take 10 mg by mouth daily      azelastine (ASTELIN) 0.1 % nasal spray 1 spray by Nasal route 2 times daily       fluticasone (FLONASE) 50 MCG/ACT nasal spray 1 spray by Nasal route 2 times daily       No current facility-administered medications for this visit. Vitals:    10/18/22 0817 10/18/22 0849   BP: (!) 160/100 139/89   Site: Left Upper Arm    Position: Sitting    Cuff Size: Large Adult    Pulse: 84    SpO2: 98%    Weight: 299 lb 9.6 oz (135.9 kg)    Height: 5' 3\" (1.6 m)      Objective   Physical Exam  Vitals reviewed. Constitutional:       General: She is not in acute distress. Eyes:      Extraocular Movements: Extraocular movements intact. Cardiovascular:      Rate and Rhythm: Normal rate and regular rhythm. Pulmonary:      Effort: Pulmonary effort is normal. No respiratory distress. Breath sounds: Normal breath sounds. Abdominal:      Palpations: Abdomen is soft. Tenderness: There is no abdominal tenderness. Musculoskeletal:      Cervical back: Neck supple.       Right lower leg: No edema. Left lower leg: No edema. Neurological:      Mental Status: She is alert and oriented to person, place, and time. Psychiatric:         Mood and Affect: Mood normal.              An electronic signature was used to authenticate this note. --Walter Dakin, DO     This dictation was generated by voice recognition computer software. Although all attempts are made to edit the dictation for accuracy, there may be errors in the transcription that are not intended.

## 2022-10-23 NOTE — PROGRESS NOTES
Patient Name:  Rosales Roberson  Patient :  1960  Patient MRN:  [id number]     Primary Oncologist: Honora Saint, MD  Referring Provider: Lynne Fonseca DO     Date of Service: 2022      Reason for Consult:  erythrocytosis. Chief Complaint:    Chief Complaint   Patient presents with    New Patient        Patient Active Problem List:     Type 2 diabetes mellitus with hyperglycemia, without long-term current use of insulin ([address]     Essential hypertension     Hypothyroidism     Hyperlipidemia     Fullness of supraclavicular fossa     Chronic rhinitis     HPI:   Abraham Pinto is a pleasant 65year old female patient who was referred for evaluation of elevated Hg. Mammogram in 2022 benign. She has cologuard in 2022.    2020 CBC unremarkable    2021 WBC 6.7, Hg 16.1, HCT 47.1, RBC 5.31. Plat 218.    2022 ALT 47. WBC 6.2, Hg 16.4, HCT 48.5, RBC 5.38. Plat 211.  10/2022 ALT 35. Total bili 1.3. TSH and free T4 wnl. WBC 7.3, Hg 16.8, HCT 51.8, , plat 211. She has been taking HCTZ for years and drinking enough water. I discussed with her the potential etiologies of polycythemia. She has been reviewing from the Internet. She is agreeable to have work-up for polycythemia including CT abdomen and pelvis to rule out hypernephroma, polycystic kidney disease and etc.    Reportedly she does not snore at the nighttime. She lost about 55 pounds over 1 year intentionally. I reviewed her medication list.  In 2022 she started vitamin for the vision as recommended by the eye doctor.     Past Medical History:   Diagnosis Date    Allergic rhinitis     Anemia     Chronic rhinitis     Diabetes mellitus ([address]     History of COVID-19     Hyperlipidemia     Hypertension     Hypothyroid     Morbid obesity ([address]     Osteoarthrosis     Tricompartmental arthritis of the left knee    Radiculopathy, cervical region     Sensorimotor neuropathy 2014    low grade of Upper Extremities    Stricture and stenosis of cervix uteri      Past Surgical History:   Procedure Laterality Date    BACK SURGERY       SECTION      DILATION AND CURETTAGE OF UTERUS      ENDOMETRIAL ABLATION      SHOULDER ARTHROSCOPY Right 2022    RIGHT SHOULDER ARTHROSCOPY ROTATOR CUFF REPAIR SUBACROMIAL DECOMPRESSION performed by Nieves Smart MD at [address] [place] [address] History:   She never smokes. Denied any alcohol consumption. She has 1 child. Family History: Mother  of lung cancer. No Known Allergies    Current Outpatient Medications on File Prior to Visit   Medication Sig Dispense Refill    metFORMIN (GLUCOPHAGE) 1000 MG tablet TAKE 1 TABLET TWICE A DAY WITH MEALS 180 tablet 1    glyBURIDE (DIABETA) 5 MG tablet TAKE 1 TABLET EVERY MORNING AND 2 TABLETS EVERY EVENING WITH MEALS 270 tablet 1    losartan (COZAAR) 100 MG tablet TAKE 1 TABLET DAILY 90 tablet 1    RYBELSUS 14 MG TABS TAKE 1 TABLET DAILY 90 tablet 1    pravastatin (PRAVACHOL) 40 MG tablet TAKE 1 TABLET DAILY 90 tablet 1    JARDIANCE 10 MG tablet TAKE 1 TABLET DAILY 90 tablet 1    amLODIPine (NORVASC) 5 MG tablet TAKE 1 TABLET DAILY (Patient taking differently: 2.5 mg) 90 tablet 1    levothyroxine (SYNTHROID) 175 MCG tablet TAKE 1 TABLET DAILY 90 tablet 1    PARoxetine (PAXIL) 10 MG tablet TAKE 1 TABLET EVERY MORNING 90 tablet 1    hydroCHLOROthiazide (HYDRODIURIL) 25 MG tablet TAKE 2 TABLETS DAILY 180 tablet 1    aspirin 81 MG EC tablet Take 81 mg by mouth nightly      montelukast (SINGULAIR) 10 MG tablet Take 10 mg by mouth daily      azelastine (ASTELIN) 0.1 % nasal spray 1 spray by Nasal route 2 times daily       fluticasone (FLONASE) 50 MCG/ACT nasal spray 1 spray by Nasal route 2 times daily       No current facility-administered medications on file prior to visit. Review of Systems:    Constitutional:  No weight loss, No fever, No chills, No night sweats. Energy level good.   Eyes:  No diplopia, No transient or permanent loss of vision, No scotomata. ENT / Mouth:  No epistaxis, No dysphagia, No hoarseness, No oral ulcers, No gingival bleeding. No sore throat, No postnasal drip, No nasal drip, No mouth pain, No sinus pain, No tinnitus, Normal hearing. Cardiovascular:  No chest pain, No palpitations, No syncope, No upper extremity edema, No lower extremity edema, No calf discomfort. Respiratory:  No cough. No hemoptysis, No pleurisy, No wheezing, No dyspnea. Gastrointestinal:  No abdominal pain, No abdominal cramping, No nausea, No vomiting, No constipation, No diarrhea, No hematochezia, No melena, No jaundice, No dyspepsia, No dysphagia. Urinary:  No dysuria, No hematuria, No urinary incontinence. Musculoskeletal:  No muscle pain, No swollen joints, No joint redness, No bone pain, No spine tenderness. Skin:  No rash, No nodules, No pruritus, No lesions. Neurologic:  No confusion, No seizures, No syncope, No tremor, No speech change, No headache, No hiccups, No abnormal gait, No sensory changes, No weakness. Psychiatric:  No depression, No anxiety, Concentration normal.  Endocrine:  No polyuria, No polydipsia, No hot flashes, No thyroid symptoms. Hematologic:  No epistaxis, No gingival bleeding, No petechiae, No ecchymosis. Lymphatic:  No lymphadenopathy, No lymphedema. Allergy / Immunologic:  No eczema, No frequent mucous infections, No frequent respiratory infections, No recurrent urticarial, No frequent skin infections.      Vital Signs: BP (!) 182/86 (Site: Right Lower Arm, Position: Sitting, Cuff Size: Large Adult)   Pulse 91   Temp 97.9 °F (36.6 °C) (Temporal)   Resp 18   Ht 5' 3\" (1.6 m)   Wt 299 lb 6.4 oz (135.8 kg)   SpO2 96%   BMI 53.04 kg/m²      Physical Exam:  CONSTITUTIONAL: awake, alert, cooperative, no apparent distress   EYES: pupils equal, round and reactive to light, sclera clear and conjunctiva normal  ENT: Normocephalic, without obvious abnormality, atraumatic  NECK: supple, symmetrical, no jugular venous distension and no carotid bruits   HEMATOLOGIC/LYMPHATIC: no cervical, supraclavicular or axillary lymphadenopathy   LUNGS: no increased work of breathing and clear to auscultation   CARDIOVASCULAR: regular rate and rhythm, normal S1 and S2, no murmur noted  ABDOMEN: normal bowel sounds x 4, soft, non-distended, non-tender, no masses palpated, no hepatosplenomegaly   MUSCULOSKELETAL: full range of motion noted, tone is normal  NEUROLOGIC: awake, alert, oriented to name, place and time. Motor skills grossly intact. SKIN: Normal skin color, texture, turgor and no jaundice.  appears intact   EXTREMITIES: no LE edema      Labs:  Hematology:  Lab Results   Component Value Date    WBC 7.3 10/13/2022    RBC 5.78 (H) 10/13/2022    HGB 16.8 (H) 10/13/2022    HCT 51.8 (H) 10/13/2022    MCV 90 10/13/2022    MCH 29.1 10/13/2022    MCHC 32.4 10/13/2022    RDW 13.1 10/13/2022     10/13/2022    MPV 10.4 04/04/2022    SEGSPCT 59 10/13/2022    EOSRELPCT 3 10/13/2022    BASOPCT 1 10/13/2022    LYMPHOPCT 28 10/13/2022    MONOPCT 8 10/13/2022    SEGSABS 5.6 04/04/2022    EOSABS 0.2 10/13/2022    BASOSABS 0.1 10/13/2022    LYMPHSABS 2.1 10/13/2022    MONOSABS 0.6 10/13/2022    DIFFTYPE AUTOMATED DIFFERENTIAL 04/04/2022    ANISOCYTOSIS 1+ 01/10/2013     Chemistry:  Lab Results   Component Value Date     06/09/2022    K 3.8 06/09/2022    CL 97 06/09/2022    CO2 21 06/09/2022    BUN 18 06/09/2022    CREATININE 0.73 06/09/2022    GLUCOSE 204 (H) 06/09/2022    CALCIUM 9.9 06/09/2022    PROT 7.7 10/13/2022    LABALBU 4.7 10/13/2022    BILITOT 1.3 (H) 10/13/2022    ALKPHOS 71 10/13/2022    AST 32 10/13/2022    ALT 35 (H) 10/13/2022    LABGLOM >60 04/04/2022    GFRAA >60 04/04/2022    AGRATIO 1.7 06/09/2022    GLOB 2.8 06/09/2022    POCGLU 102 (H) 04/06/2022     No results found for: MMA, LDH, HOMOCYSTEINE  No components found for: LD  Lab Results   Component Value Date    TSHHS 2.864 01/10/2013    T4FREE 1.51 10/13/2022    FT3 3.0 03/18/2011     Immunology:  Lab Results   Component Value Date    PROT 7.7 10/13/2022     Coagulation Panel:  Lab Results   Component Value Date    PROTIME 10.9 12/29/2013    INR 1.01 12/29/2013    APTT 27.2 12/29/2013    DDIMER <200 12/29/2013      Observations:  PHQ-9 Total Score: 0 (11/9/2022  8:59 AM)     Assessment & Plan:  1. She was referred for evaluation of elevated Hg. we discussed about potential etiologies of polycythemia. I ordered labs and imaging study as mentioned above. I recommend to drink enough water. If the work-up remains inconclusive, I will consider bone marrow study. 2. Mammogram in June 2022 benign. She has cologuard in 2/2022.    3.  She is diabetic. She lost weight intentionally. I encouraged her to lose more weight. Return to clinic in 3 weeks to discuss the report. All of her questions been answered for today. I have discussed the above stated plan with the patient and they verbalized understanding and agreed with the plan. Thank you for allowing us to participate in this patient's care.

## 2022-11-09 ENCOUNTER — INITIAL CONSULT (OUTPATIENT)
Dept: ONCOLOGY | Age: 62
End: 2022-11-09
Payer: COMMERCIAL

## 2022-11-09 ENCOUNTER — HOSPITAL ENCOUNTER (OUTPATIENT)
Dept: INFUSION THERAPY | Age: 62
Discharge: HOME | End: 2022-11-09
Payer: COMMERCIAL

## 2022-11-09 VITALS
RESPIRATION RATE: 18 BRPM | WEIGHT: 293 LBS | HEART RATE: 91 BPM | OXYGEN SATURATION: 96 % | BODY MASS INDEX: 51.91 KG/M2 | DIASTOLIC BLOOD PRESSURE: 86 MMHG | SYSTOLIC BLOOD PRESSURE: 182 MMHG | TEMPERATURE: 97.9 F | HEIGHT: 63 IN

## 2022-11-09 DIAGNOSIS — D75.1 ERYTHROCYTOSIS: ICD-10-CM

## 2022-11-09 DIAGNOSIS — D75.1 ERYTHROCYTOSIS: Primary | ICD-10-CM

## 2022-11-09 LAB
BASOPHILS ABSOLUTE: 0.1 K/CU MM
BASOPHILS RELATIVE PERCENT: 0.9 % (ref 0–1)
DIFFERENTIAL TYPE: ABNORMAL
EOSINOPHILS ABSOLUTE: 0.2 K/CU MM
EOSINOPHILS RELATIVE PERCENT: 2.3 % (ref 0–3)
HCT VFR BLD CALC: 49.6 % (ref 37–47)
HEMOGLOBIN: 16.3 GM/DL (ref 12.5–16)
IMMATURE NEUTROPHIL %: 0.9 % (ref 0–0.43)
LYMPHOCYTES ABSOLUTE: 2 K/CU MM
LYMPHOCYTES RELATIVE PERCENT: 27.9 % (ref 24–44)
MCH RBC QN AUTO: 30 PG (ref 27–31)
MCHC RBC AUTO-ENTMCNC: 32.9 % (ref 32–36)
MCV RBC AUTO: 91.3 FL (ref 78–100)
MONOCYTES ABSOLUTE: 0.5 K/CU MM
MONOCYTES RELATIVE PERCENT: 7.1 % (ref 0–4)
NUCLEATED RBC %: 0 %
PDW BLD-RTO: 13.4 % (ref 11.7–14.9)
PLATELET # BLD: 214 K/CU MM (ref 140–440)
PMV BLD AUTO: 10.6 FL (ref 7.5–11.1)
RBC # BLD: 5.43 M/CU MM (ref 4.2–5.4)
SEGMENTED NEUTROPHILS ABSOLUTE COUNT: 4.3 K/CU MM
SEGMENTED NEUTROPHILS RELATIVE PERCENT: 60.9 % (ref 36–66)
TOTAL IMMATURE NEUTOROPHIL: 0.06 K/CU MM
TOTAL NUCLEATED RBC: 0 K/CU MM
WBC # BLD: 7 K/CU MM (ref 4–10.5)

## 2022-11-09 PROCEDURE — 82668 ASSAY OF ERYTHROPOIETIN: CPT

## 2022-11-09 PROCEDURE — 36415 COLL VENOUS BLD VENIPUNCTURE: CPT

## 2022-11-09 PROCEDURE — 3078F DIAST BP <80 MM HG: CPT | Performed by: INTERNAL MEDICINE

## 2022-11-09 PROCEDURE — 99204 OFFICE O/P NEW MOD 45 MIN: CPT | Performed by: INTERNAL MEDICINE

## 2022-11-09 PROCEDURE — 85025 COMPLETE CBC W/AUTO DIFF WBC: CPT

## 2022-11-09 PROCEDURE — 3074F SYST BP LT 130 MM HG: CPT | Performed by: INTERNAL MEDICINE

## 2022-11-09 PROCEDURE — 99211 OFF/OP EST MAY X REQ PHY/QHP: CPT

## 2022-11-09 ASSESSMENT — PATIENT HEALTH QUESTIONNAIRE - PHQ9
SUM OF ALL RESPONSES TO PHQ QUESTIONS 1-9: 0
SUM OF ALL RESPONSES TO PHQ QUESTIONS 1-9: 0
2. FEELING DOWN, DEPRESSED OR HOPELESS: 0
SUM OF ALL RESPONSES TO PHQ QUESTIONS 1-9: 0
1. LITTLE INTEREST OR PLEASURE IN DOING THINGS: 0
SUM OF ALL RESPONSES TO PHQ9 QUESTIONS 1 & 2: 0
SUM OF ALL RESPONSES TO PHQ QUESTIONS 1-9: 0

## 2022-11-09 NOTE — PROGRESS NOTES
MA Rooming Questions  Patient: Harika Robles  MRN: [id number]    Date: 11/9/2022        SADA Marquez, CMA

## 2022-11-10 LAB — ERYTHROPOIETIN: 15 MU/ML (ref 4–27)

## 2022-11-23 NOTE — PROGRESS NOTES
Patient Name:  Rhona Francis  Patient :  1960  Patient MRN:  [id number]     Primary Oncologist: Marce Beaulieu MD  Referring Provider: Rowan Alcala DO     Date of Service: 2022       Chief Complaint:    Chief Complaint   Patient presents with    Follow-up       She came in for follow up visit. Patient Active Problem List:     Type 2 diabetes mellitus with hyperglycemia, without long-term current use of insulin ([facility])     Essential hypertension     Hypothyroidism     Hyperlipidemia     Fullness of supraclavicular fossa     Chronic rhinitis     HPI:   Nancie Light is a pleasant 65year old female patient who was referred for evaluation of elevated Hg. Mammogram in 2022 benign. She has cologuard in 2022.  2020 CBC unremarkable  2021 WBC 6.7, Hg 16.1, HCT 47.1, RBC 5.31. Plat 218.    2022 ALT 47. WBC 6.2, Hg 16.4, HCT 48.5, RBC 5.38. Plat 211.  10/2022 ALT 35. Total bili 1.3. TSH and free T4 wnl. WBC 7.3, Hg 16.8, HCT 51.8, , plat 211. She has been taking HCTZ for years and drinking enough water. I discussed with her the potential etiologies of polycythemia. She has been reviewing from the Internet. She is agreeable to have work-up for polycythemia including CT abdomen and pelvis to rule out hypernephroma, polycystic kidney disease and etc.    Reportedly she does not snore at the nighttime. She lost about 55 pounds over 1 year intentionally. I reviewed her medication list.  In 2022 she started vitamin for the vision as recommended by the eye doctor. 2022 she came in for follow up visit. Mammogram 2022 benign. 10/2022 ALT 35, total bili 1.3. Hg 16.8, HCT 51.8,    2022 Hg 16.3, HCT 49.6. Epo 15. Flow cytometry, BCR ABL study from peripheral blood in 2022 unremarkable. TATIANA-2 with reflex unremarkable. CT abdomen 2022  1. Cirrhosis with stigmata of portal venous hypertension.     She likely has VELASQUEZ, I referred to GI for further evaluation of liver cirrhosis. She takes baby ASA. No acute pain. Denied any nausea, vomiting or diarrhea. No fever or chills. No chest pain, shortness of breath or palpitation. No headache or dizzy spell. No specific bone pain. No melena or hematochezia. Denied any dysuria or hematuria. Past Medical History:   Diagnosis Date    Allergic rhinitis     Anemia     Chronic rhinitis     Diabetes mellitus ([facility]     History of COVID-19     Hyperlipidemia     Hypertension     Hypothyroid     Morbid obesity ([facility]     Osteoarthrosis     Tricompartmental arthritis of the left knee    Radiculopathy, cervical region     Sensorimotor neuropathy 2014    low grade of Upper Extremities    Stricture and stenosis of cervix uteri      Past Surgical History:   Procedure Laterality Date    BACK SURGERY       SECTION      DILATION AND CURETTAGE OF UTERUS      ENDOMETRIAL ABLATION      SHOULDER ARTHROSCOPY Right 2022    RIGHT SHOULDER ARTHROSCOPY ROTATOR CUFF REPAIR SUBACROMIAL DECOMPRESSION performed by Nieves Smart MD at [address] [place] [address] History:   She never smokes. Denied any alcohol consumption. She has 1 child. Family History: Mother  of lung cancer. Review of Systems: The remainder of ROS is unremarkable. Vital Signs: BP (!) 151/78 (Site: Right Lower Arm, Position: Sitting, Cuff Size: Large Adult)   Pulse 87   Temp 97.9 °F (36.6 °C) (Temporal)   Resp 18   Ht 5' 3\" (1.6 m)   Wt 296 lb (134.3 kg)   SpO2 97%   BMI 52.43 kg/m²      Physical Exam:  CONSTITUTIONAL: awake, alert, cooperative, no apparent distress   EYES: pupils equal, round and reactive to light.  Sclera clear and conjunctiva normal  ENT: Normocephalic, without obvious abnormality, atraumatic  NECK: supple, symmetrical, no jugular venous distension and no carotid bruits   HEMATOLOGIC/LYMPHATIC: no cervical, supraclavicular or axillary lymphadenopathy   LUNGS: no increased work of breathing and clear to auscultation CARDIOVASCULAR: regular rate and rhythm, normal S1 and S2, no murmur noted  ABDOMEN: normal bowel sound, soft, non-distended, non-tender, no masses palpated, no rebound or guarding. MUSCULOSKELETAL: full range of motion noted, tone is normal  NEUROLOGIC: Motor skills grossly intact. SKIN: Normal skin color, texture, turgor and no jaundice. appears intact   EXTREMITIES: no LE edema or cyanosis.      Labs:  Hematology:  Lab Results   Component Value Date    WBC 7.0 11/09/2022    RBC 5.43 (H) 11/09/2022    HGB 16.3 (H) 11/09/2022    HCT 49.6 (H) 11/09/2022    MCV 91.3 11/09/2022    MCH 30.0 11/09/2022    MCHC 32.9 11/09/2022    RDW 13.4 11/09/2022     11/09/2022    MPV 10.6 11/09/2022    SEGSPCT 60.9 11/09/2022    EOSRELPCT 2.3 11/09/2022    BASOPCT 0.9 11/09/2022    LYMPHOPCT 27.9 11/09/2022    MONOPCT 7.1 (H) 11/09/2022    SEGSABS 4.3 11/09/2022    EOSABS 0.2 11/09/2022    BASOSABS 0.1 11/09/2022    LYMPHSABS 2.0 11/09/2022    MONOSABS 0.5 11/09/2022    DIFFTYPE AUTOMATED DIFFERENTIAL 11/09/2022    ANISOCYTOSIS 1+ 01/10/2013     Chemistry:  Lab Results   Component Value Date     06/09/2022    K 3.8 06/09/2022    CL 97 06/09/2022    CO2 21 06/09/2022    BUN 18 06/09/2022    CREATININE 0.73 06/09/2022    GLUCOSE 204 (H) 06/09/2022    CALCIUM 9.9 06/09/2022    PROT 7.7 10/13/2022    LABALBU 4.7 10/13/2022    BILITOT 1.3 (H) 10/13/2022    ALKPHOS 71 10/13/2022    AST 32 10/13/2022    ALT 35 (H) 10/13/2022    LABGLOM >60 04/04/2022    GFRAA >60 04/04/2022    AGRATIO 1.7 06/09/2022    GLOB 2.8 06/09/2022    POCGLU 102 (H) 04/06/2022     Lab Results   Component Value Date    TSHHS 2.864 01/10/2013    T4FREE 1.51 10/13/2022    FT3 3.0 03/18/2011     Immunology:  Lab Results   Component Value Date    PROT 7.7 10/13/2022     Coagulation Panel:  Lab Results   Component Value Date    PROTIME 10.9 12/29/2013    INR 1.01 12/29/2013    APTT 27.2 12/29/2013    DDIMER <200 12/29/2013      Observations:  No data recorded     Assessment & Plan:  1. She was referred for evaluation of elevated Hg. we discussed about potential etiologies of polycythemia. Tarik-2 with reflex negative. Flow and BCR Abl study unremarkable. O2 sat on RA wnl. I recommend to drink enough water. I will continue to monitor CBC. I may consider bone marrow study if she has worsening erythrocytosis. 2. Mammogram in June 2022 benign. She has cologuard in 2/2022.    3.  She is diabetic. She lost weight intentionally. I encouraged her to lose more weight. 4. Incidentally CT abdomen showed liver cirrhosis. ? VELASQUEZ. I referred to GI for further evaluation. Return to clinic in 12 weeks to discuss the report. All of her questions been answered for today. I have discussed the above stated plan with the patient and they verbalized understanding and agreed with the plan.

## 2022-11-28 ENCOUNTER — HOSPITAL ENCOUNTER (OUTPATIENT)
Dept: CT IMAGING | Age: 62
Discharge: HOME | End: 2022-11-28
Payer: COMMERCIAL

## 2022-11-28 DIAGNOSIS — D75.1 ERYTHROCYTOSIS: ICD-10-CM

## 2022-11-28 PROCEDURE — 6360000004 HC RX CONTRAST MEDICATION: Performed by: INTERNAL MEDICINE

## 2022-11-28 PROCEDURE — A4641 RADIOPHARM DX AGENT NOC: HCPCS | Performed by: INTERNAL MEDICINE

## 2022-11-28 PROCEDURE — 74177 CT ABD & PELVIS W/CONTRAST: CPT

## 2022-11-28 RX ORDER — SODIUM CHLORIDE 0.9 % (FLUSH) 0.9 %
10 SYRINGE (ML) INJECTION PRN
Status: DISCONTINUED | OUTPATIENT
Start: 2022-11-28 | End: 2022-11-29 | Stop reason: HOSPADM

## 2022-11-28 RX ADMIN — BARIUM SULFATE 900 ML: 20 SUSPENSION ORAL at 10:57

## 2022-11-28 RX ADMIN — IOPAMIDOL 75 ML: 755 INJECTION, SOLUTION INTRAVENOUS at 10:57

## 2022-11-30 ENCOUNTER — HOSPITAL ENCOUNTER (OUTPATIENT)
Dept: INFUSION THERAPY | Age: 62
Discharge: HOME | End: 2022-11-30
Payer: COMMERCIAL

## 2022-11-30 ENCOUNTER — OFFICE VISIT (OUTPATIENT)
Dept: ONCOLOGY | Age: 62
End: 2022-11-30
Payer: COMMERCIAL

## 2022-11-30 VITALS
WEIGHT: 293 LBS | BODY MASS INDEX: 51.91 KG/M2 | OXYGEN SATURATION: 97 % | SYSTOLIC BLOOD PRESSURE: 151 MMHG | RESPIRATION RATE: 18 BRPM | HEIGHT: 63 IN | TEMPERATURE: 97.9 F | DIASTOLIC BLOOD PRESSURE: 78 MMHG | HEART RATE: 87 BPM

## 2022-11-30 DIAGNOSIS — K74.60 CIRRHOSIS OF LIVER WITHOUT ASCITES, UNSPECIFIED HEPATIC CIRRHOSIS TYPE (HCC): ICD-10-CM

## 2022-11-30 DIAGNOSIS — D75.1 ERYTHROCYTOSIS: Primary | ICD-10-CM

## 2022-11-30 PROCEDURE — 99214 OFFICE O/P EST MOD 30 MIN: CPT | Performed by: INTERNAL MEDICINE

## 2022-11-30 PROCEDURE — 99211 OFF/OP EST MAY X REQ PHY/QHP: CPT

## 2022-11-30 PROCEDURE — 3078F DIAST BP <80 MM HG: CPT | Performed by: INTERNAL MEDICINE

## 2022-11-30 PROCEDURE — 3074F SYST BP LT 130 MM HG: CPT | Performed by: INTERNAL MEDICINE

## 2022-11-30 NOTE — PROGRESS NOTES
MA Rooming Questions  Patient: Robert Viveros  MRN: [id number]    Date: 11/30/2022        1. Do you have any new issues?   no         2. Do you need any refills on medications?    no    3. Have you had any imaging done since your last visit? yes - CT    4. Have you been hospitalized or seen in the emergency room since your last visit here?   no    5. Did the patient have a depression screening completed today?  No    No data recorded     PHQ-9 Given to (if applicable):               PHQ-9 Score (if applicable):                     [] Positive     []  Negative              Does question #9 need addressed (if applicable)                     [] Yes    []  No               Andressa Kessler CMA

## 2022-12-01 ENCOUNTER — TELEPHONE (OUTPATIENT)
Dept: GASTROENTEROLOGY | Age: 62
End: 2022-12-01

## 2022-12-08 ENCOUNTER — TELEPHONE (OUTPATIENT)
Dept: GASTROENTEROLOGY | Age: 62
End: 2022-12-08

## 2022-12-08 NOTE — TELEPHONE ENCOUNTER
Called pt in regard to a referral for cirrhosis. LM w/ pt  katharine to have pt call us to set up an appt.

## 2023-01-09 ENCOUNTER — OFFICE VISIT (OUTPATIENT)
Dept: GASTROENTEROLOGY | Age: 63
End: 2023-01-09
Payer: COMMERCIAL

## 2023-01-09 VITALS
WEIGHT: 293 LBS | HEART RATE: 103 BPM | TEMPERATURE: 97.2 F | DIASTOLIC BLOOD PRESSURE: 86 MMHG | SYSTOLIC BLOOD PRESSURE: 136 MMHG | HEIGHT: 63 IN | OXYGEN SATURATION: 96 % | BODY MASS INDEX: 51.91 KG/M2

## 2023-01-09 DIAGNOSIS — Z12.11 ENCOUNTER FOR SCREENING COLONOSCOPY: ICD-10-CM

## 2023-01-09 DIAGNOSIS — K74.60 CIRRHOSIS OF LIVER WITHOUT ASCITES, UNSPECIFIED HEPATIC CIRRHOSIS TYPE (HCC): Primary | ICD-10-CM

## 2023-01-09 PROCEDURE — 3078F DIAST BP <80 MM HG: CPT | Performed by: NURSE PRACTITIONER

## 2023-01-09 PROCEDURE — 3074F SYST BP LT 130 MM HG: CPT | Performed by: NURSE PRACTITIONER

## 2023-01-09 PROCEDURE — 99203 OFFICE O/P NEW LOW 30 MIN: CPT | Performed by: NURSE PRACTITIONER

## 2023-01-09 RX ORDER — BISACODYL 5 MG
TABLET, DELAYED RELEASE (ENTERIC COATED) ORAL
Qty: 4 TABLET | Refills: 0 | Status: SHIPPED | OUTPATIENT
Start: 2023-01-09

## 2023-01-09 RX ORDER — POLYETHYLENE GLYCOL 3350 17 G/17G
238 POWDER, FOR SOLUTION ORAL ONCE
Qty: 238 G | Refills: 0 | Status: SHIPPED | OUTPATIENT
Start: 2023-01-09 | End: 2023-01-09

## 2023-01-09 ASSESSMENT — ENCOUNTER SYMPTOMS
COUGH: 0
SHORTNESS OF BREATH: 0
COLOR CHANGE: 0
EYE PAIN: 0
PHOTOPHOBIA: 0
NAUSEA: 0
DIARRHEA: 0
WHEEZING: 0
BLOOD IN STOOL: 0
BACK PAIN: 1
VOMITING: 0
CONSTIPATION: 0
ABDOMINAL PAIN: 0

## 2023-01-09 NOTE — PROGRESS NOTES
Rhona Francis 65 y.o. female was seen by IKE Mendoza on 1-9-2023     Wt Readings from Last 3 Encounters:   01/09/23 299 lb 6.4 oz (135.8 kg)   11/30/22 296 lb (134.3 kg)   11/09/22 299 lb 6.4 oz (135.8 kg)       HPI  Rhona Francis is a pleasant 65 y.o.  female who presents today with her  for abnormal finding of cirrhosis on her CT of the abdomen/pelvis. She has a past medical history of allergic rhinitis, anemia, chronic rhinitis, diabetes mellitus, history of COVID-19, hyperlipidemia, hypertension, hypothyroid, morbid obesity, osteoarthrosis, radiculopathy, cervical region, sensorimotor neuropathy, and stricture and stenosis of cervix uteri. She mentioned being obese since 1986. She was referred by Dr. Shrari Caal due to abnormal findings of cirrhosis on her CT. Her CT of the abdomen/pelvis done on 11- showed: cirrhosis with stigmata of portal venous hypertension. She denies alcohol use. Ibuprofen use once a month. No known liver disease or family history of liver disease. She has never had an EGD or colonoscopy. Hgb A1C 8.1. Her lab work done on 10- showed Total Bilirubin 1.3, Alkaline phosphatase 71, AST 32, ALT 35 and Platelets 183. Her appetite is good without early satiety. Her weight is stable. She has lost fifty five pounds in the last year and half with portion control and smaller meals. No nausea or vomiting. No abdominal pain, bloating or distention. No heartburn or acid reflux. No nocturnal awakenings with acid reflux. No dysphagia or pain with swallowing. No excess belching or flatulence. She denies changes in her bowel pattern. Her typical bowel pattern twice daily with soft brown formed stools. No diarrhea or constipation. No blood in her stools or melena. No family history of stomach or colon cancer. ROS  Review of Systems   Constitutional:  Negative for chills, diaphoresis, fever and unexpected weight change.    HENT:  Negative for ear pain, hearing loss and tinnitus. Eyes:  Positive for visual disturbance. Negative for photophobia and pain. Respiratory:  Negative for cough, shortness of breath and wheezing. Cardiovascular:  Negative for chest pain, palpitations and leg swelling. Gastrointestinal:  Negative for abdominal pain, blood in stool, constipation, diarrhea, nausea and vomiting. Endocrine: Negative for cold intolerance, heat intolerance and polydipsia. Genitourinary:  Negative for dysuria, frequency and urgency. Musculoskeletal:  Positive for back pain. Negative for myalgias and neck pain. Skin:  Negative for color change, pallor and rash. Allergic/Immunologic: Negative for environmental allergies and food allergies. Neurological:  Negative for dizziness, seizures, weakness and headaches. Hematological:  Does not bruise/bleed easily. Psychiatric/Behavioral:  Negative for dysphoric mood, sleep disturbance and suicidal ideas. The patient is not nervous/anxious.       Allergies  No Known Allergies    Medications  Current Outpatient Medications   Medication Sig Dispense Refill    metFORMIN (GLUCOPHAGE) 1000 MG tablet TAKE 1 TABLET TWICE A DAY WITH MEALS 180 tablet 1    glyBURIDE (DIABETA) 5 MG tablet TAKE 1 TABLET EVERY MORNING AND 2 TABLETS EVERY EVENING WITH MEALS 270 tablet 1    losartan (COZAAR) 100 MG tablet TAKE 1 TABLET DAILY 90 tablet 1    RYBELSUS 14 MG TABS TAKE 1 TABLET DAILY 90 tablet 1    pravastatin (PRAVACHOL) 40 MG tablet TAKE 1 TABLET DAILY 90 tablet 1    JARDIANCE 10 MG tablet TAKE 1 TABLET DAILY 90 tablet 1    amLODIPine (NORVASC) 5 MG tablet TAKE 1 TABLET DAILY (Patient taking differently: 2.5 mg) 90 tablet 1    levothyroxine (SYNTHROID) 175 MCG tablet TAKE 1 TABLET DAILY 90 tablet 1    PARoxetine (PAXIL) 10 MG tablet TAKE 1 TABLET EVERY MORNING 90 tablet 1    hydroCHLOROthiazide (HYDRODIURIL) 25 MG tablet TAKE 2 TABLETS DAILY 180 tablet 1    aspirin 81 MG EC tablet Take 81 mg by mouth nightly montelukast (SINGULAIR) 10 MG tablet Take 10 mg by mouth daily      azelastine (ASTELIN) 0.1 % nasal spray 1 spray by Nasal route 2 times daily       fluticasone (FLONASE) 50 MCG/ACT nasal spray 1 spray by Nasal route 2 times daily       No current facility-administered medications for this visit. Past medical history:   She has a past medical history of Allergic rhinitis, Anemia, Chronic rhinitis, Diabetes mellitus ([address] History of COVID-19, Hyperlipidemia, Hypertension, Hypothyroid, Morbid obesity ([address] Osteoarthrosis, Radiculopathy, cervical region, Sensorimotor neuropathy, and Stricture and stenosis of cervix uteri. Past surgical history:  She has a past surgical history that includes back surgery;  section; Endometrial ablation; Shoulder arthroscopy (Right, 2022); and Dilation and curettage of uterus. Social History:  She reports that she has never smoked. She has never used smokeless tobacco. She reports that she does not drink alcohol and does not use drugs. Family history:  Her family history includes Cancer in her mother. Objective    Vitals:    23 1604   BP: 136/86   Pulse: (!) 103   Temp: 97.2 °F (36.2 °C)   SpO2: 96%        Physical exam    Physical Exam  Vitals reviewed. Constitutional:       General: She is not in acute distress. Appearance: Normal appearance. She is well-developed. She is obese. She is not ill-appearing, toxic-appearing or diaphoretic. HENT:      Head: Normocephalic and atraumatic. Nose: Nose normal.      Mouth/Throat:      Mouth: Mucous membranes are moist.   Eyes:      Conjunctiva/sclera: Conjunctivae normal.      Pupils: Pupils are equal, round, and reactive to light. Neck:      Thyroid: No thyromegaly. Vascular: No JVD. Trachea: No tracheal deviation. Cardiovascular:      Rate and Rhythm: Normal rate and regular rhythm. Pulses: Normal pulses. Heart sounds: Normal heart sounds. No murmur heard.     No friction rub. No gallop. Pulmonary:      Effort: Pulmonary effort is normal. No respiratory distress. Breath sounds: Normal breath sounds. No stridor. No wheezing, rhonchi or rales. Chest:      Chest wall: No tenderness. Abdominal:      General: Bowel sounds are normal. There is no distension. Palpations: Abdomen is soft. There is no mass. Tenderness: There is no abdominal tenderness. There is no guarding or rebound. Hernia: No hernia is present. Musculoskeletal:         General: Normal range of motion. Cervical back: Normal range of motion and neck supple. Lymphadenopathy:      Cervical: No cervical adenopathy. Skin:     General: Skin is warm and dry. Neurological:      Mental Status: She is alert and oriented to person, place, and time. Psychiatric:         Mood and Affect: Mood normal.       No visits with results within 2 Month(s) from this visit. Latest known visit with results is:   Hospital Outpatient Visit on 11/09/2022   Component Date Value Ref Range Status    BCR/ABL t(9,22) 11/09/2022 SEE REPORT FROM REFERENCE LAB. Final    CALR Mutation 11/09/2022 SEE REPORT FROM REFERENCE LAB. Final     Mutation 11/09/2022 SEE REPORT FROM REFERENCE LAB. Final    JAK2 Gene Mutation Qualitative 11/09/2022 SEE REPORT FROM REFERENCE LAB. Final    JAK2 Exons 12-15 Mutation 11/09/2022 SEE REPORT FROM REFERENCE LAB. Final    Erythropoietin 11/09/2022 15  4 - 27 mU/mL Final    Comment: (NOTE)  INTERPRETIVE INFORMATION: Erythropoietin  Normal serum concentrations of erythropoietin for 95% of   individuals with normal hematocrits range from 4-27 mU/mL. As the hematocrit is lowered by iron deficiency, aplastic, or   hemolytic anemia, the concentration of erythropoietin increases as   shown in the graph below. In the absence of anemia, elevated   concentrations are seen in renal tumors, as a manifestation of   renal transplant rejection, and in secondary polycythemia. Low   values may be observed in hemochromatosis. Expected Erythropoietin Concentrations in Patients                    with Uncomplicated Anemia    Erythropoietin (mU/mL)               100,000 - +                         +                10,000 - +. ..... .                         + . ... Lucyann Push Lucyann Push .                 1,000 - +    . ... Lucyann Push Lucyann Push .                         +     . ...... Lucyann Push 100 - +       . ...... .                         +        . ...... Lucyann Push 10 - +          . ...... Lucyann Push +---+---+---+---+---+---+                        10   20  30  40  50  60  70                               (Hematocrit %)           (Contributions To Nephrology [id number])  Decreased erythropoietin concentrations with an elevated   hematocrit are observed in patients with polycythemia rubra vera,   and with a decreased hematocrit in patients with HIV infection who   are receiving AZT. Patients on AZT who have anemia and   erythropoietin concentrations of less than or equal to 500 mU/mL   may benefit from therapy with recombinant EPO ([facility]   [id number]).   Performed By: [facility] [address]  [place], [address] [facility]  : Bryce Rowley MD, PhD      WBC 11/09/2022 7.0  4.0 - 10.5 K/CU MM Final    RBC 11/09/2022 5.43 (A)  4.2 - 5.4 M/CU MM Final    Hemoglobin 11/09/2022 16.3 (A)  12.5 - 16.0 GM/DL Final    Hematocrit 11/09/2022 49.6 (A)  37 - 47 % Final    MCV 11/09/2022 91.3  78 - 100 FL Final    MCH 11/09/2022 30.0  27 - 31 PG Final    MCHC 11/09/2022 32.9  32.0 - 36.0 % Final    RDW 11/09/2022 13.4  11.7 - 14.9 % Final    Platelets 30/63/4600 214  140 - 440 K/CU MM Final    MPV 11/09/2022 10.6  7.5 - 11.1 FL Final    Differential Type 11/09/2022 AUTOMATED DIFFERENTIAL   Final    Segs Relative 11/09/2022 60.9  36 - 66 % Final    Lymphocytes % 11/09/2022 27.9  24 - 44 % Final    Monocytes % 11/09/2022 7.1 (A)  0 - 4 % Final Eosinophils % 11/09/2022 2.3  0 - 3 % Final    Basophils % 11/09/2022 0.9  0 - 1 % Final    Segs Absolute 11/09/2022 4.3  K/CU MM Final    Lymphocytes Absolute 11/09/2022 2.0  K/CU MM Final    Monocytes Absolute 11/09/2022 0.5  K/CU MM Final    Eosinophils Absolute 11/09/2022 0.2  K/CU MM Final    Basophils Absolute 11/09/2022 0.1  K/CU MM Final    Nucleated RBC % 11/09/2022 0.0  % Final    Total Nucleated RBC 11/09/2022 0.0  K/CU MM Final    Total Immature Neutrophil 11/09/2022 0.06  K/CU MM Final    Immature Neutrophil % 11/09/2022 0.9 (A)  0 - 0.43 % Final       Assessment and Plan:  1. Will plan for an EGD/colonoscopy with MAC anesthesia. The patient was informed of the risks and benefits of the procedure- the patient has decided to hold off on procedures for now due to cost; discussed importance of procedures and she wants to hold off on for now. 2.  Abnormal finding on CT showing cirrhosis of the liver most likely due to fatty liver with metabolic syndrome. Fib 4 index 1.54. Will order chronic liver lab work up to rule out PBC, autoimmune hepatitis, etc.  Will order abdominal ultrasound to rule out liver cancer. The patient was instructed to stay on low sodium diet <2 grams, avoid NSAID's, may take Tylenol but do not exceed 2 grams. Recommend continue with weight loss and avoidance of alcohol. Recommend improved glycemic control. The patient was provided with information on cirrhosis and liver disease diet. Discussed fibroscan and she would like to hold off on this for now. 3.  The patient was encouraged to follow-up in 3 months. Total time:  30 minutes.

## 2023-02-22 ENCOUNTER — HOSPITAL ENCOUNTER (OUTPATIENT)
Dept: ULTRASOUND IMAGING | Age: 63
Discharge: HOME | End: 2023-02-22
Payer: COMMERCIAL

## 2023-02-22 DIAGNOSIS — K74.60 CIRRHOSIS OF LIVER WITHOUT ASCITES, UNSPECIFIED HEPATIC CIRRHOSIS TYPE (HCC): ICD-10-CM

## 2023-02-22 PROCEDURE — 76705 ECHO EXAM OF ABDOMEN: CPT

## 2023-02-27 ENCOUNTER — HOSPITAL ENCOUNTER (OUTPATIENT)
Dept: INFUSION THERAPY | Age: 63
Discharge: HOME | End: 2023-02-27
Payer: COMMERCIAL

## 2023-02-27 DIAGNOSIS — K74.60 CIRRHOSIS OF LIVER WITHOUT ASCITES, UNSPECIFIED HEPATIC CIRRHOSIS TYPE (HCC): ICD-10-CM

## 2023-02-27 DIAGNOSIS — D75.1 ERYTHROCYTOSIS: ICD-10-CM

## 2023-02-27 LAB
ALBUMIN SERPL-MCNC: 4.6 GM/DL (ref 3.4–5)
ALP BLD-CCNC: 78 IU/L (ref 40–128)
ALT SERPL-CCNC: 34 U/L (ref 10–40)
ANION GAP SERPL CALCULATED.3IONS-SCNC: 13 MMOL/L (ref 4–16)
AST SERPL-CCNC: 25 IU/L (ref 15–37)
BASOPHILS ABSOLUTE: 0.1 K/CU MM
BASOPHILS RELATIVE PERCENT: 0.9 % (ref 0–1)
BILIRUB SERPL-MCNC: 1.1 MG/DL (ref 0–1)
BUN SERPL-MCNC: 14 MG/DL (ref 6–23)
CALCIUM SERPL-MCNC: 10.1 MG/DL (ref 8.3–10.6)
CHLORIDE BLD-SCNC: 96 MMOL/L (ref 99–110)
CO2: 29 MMOL/L (ref 21–32)
CREAT SERPL-MCNC: 0.6 MG/DL (ref 0.6–1.1)
DIFFERENTIAL TYPE: ABNORMAL
EOSINOPHILS ABSOLUTE: 0.2 K/CU MM
EOSINOPHILS RELATIVE PERCENT: 2.6 % (ref 0–3)
GFR SERPL CREATININE-BSD FRML MDRD: >60 ML/MIN/1.73M2
GLUCOSE SERPL-MCNC: 160 MG/DL (ref 70–99)
HCT VFR BLD CALC: 49.5 % (ref 37–47)
HEMOGLOBIN: 16.3 GM/DL (ref 12.5–16)
IMMATURE NEUTROPHIL %: 0.7 % (ref 0–0.43)
LYMPHOCYTES ABSOLUTE: 2.9 K/CU MM
LYMPHOCYTES RELATIVE PERCENT: 36.1 % (ref 24–44)
MCH RBC QN AUTO: 30 PG (ref 27–31)
MCHC RBC AUTO-ENTMCNC: 32.9 % (ref 32–36)
MCV RBC AUTO: 91 FL (ref 78–100)
MONOCYTES ABSOLUTE: 0.6 K/CU MM
MONOCYTES RELATIVE PERCENT: 7.9 % (ref 0–4)
NUCLEATED RBC %: 0 %
PDW BLD-RTO: 13.6 % (ref 11.7–14.9)
PLATELET # BLD: 232 K/CU MM (ref 140–440)
PMV BLD AUTO: 10.3 FL (ref 7.5–11.1)
POTASSIUM SERPL-SCNC: 3.8 MMOL/L (ref 3.5–5.1)
RBC # BLD: 5.44 M/CU MM (ref 4.2–5.4)
SEGMENTED NEUTROPHILS ABSOLUTE COUNT: 4.2 K/CU MM
SEGMENTED NEUTROPHILS RELATIVE PERCENT: 51.8 % (ref 36–66)
SODIUM BLD-SCNC: 138 MMOL/L (ref 135–145)
TOTAL IMMATURE NEUTOROPHIL: 0.06 K/CU MM
TOTAL NUCLEATED RBC: 0 K/CU MM
TOTAL PROTEIN: 7.6 GM/DL (ref 6.4–8.2)
TOTAL RETICULOCYTE COUNT: 0.13 K/CU MM
WBC # BLD: 8.2 K/CU MM (ref 4–10.5)

## 2023-02-27 PROCEDURE — 80053 COMPREHEN METABOLIC PANEL: CPT

## 2023-02-27 PROCEDURE — 36415 COLL VENOUS BLD VENIPUNCTURE: CPT

## 2023-02-27 PROCEDURE — 85025 COMPLETE CBC W/AUTO DIFF WBC: CPT

## 2023-03-06 ENCOUNTER — HOSPITAL ENCOUNTER (OUTPATIENT)
Dept: INFUSION THERAPY | Age: 63
Discharge: HOME | End: 2023-03-06
Payer: COMMERCIAL

## 2023-03-06 ENCOUNTER — OFFICE VISIT (OUTPATIENT)
Dept: ONCOLOGY | Age: 63
End: 2023-03-06
Payer: COMMERCIAL

## 2023-03-06 VITALS
OXYGEN SATURATION: 93 % | DIASTOLIC BLOOD PRESSURE: 91 MMHG | TEMPERATURE: 97.6 F | HEART RATE: 103 BPM | HEIGHT: 63 IN | BODY MASS INDEX: 51.91 KG/M2 | WEIGHT: 293 LBS | SYSTOLIC BLOOD PRESSURE: 160 MMHG

## 2023-03-06 DIAGNOSIS — D75.1 ERYTHROCYTOSIS: Primary | ICD-10-CM

## 2023-03-06 PROCEDURE — 3078F DIAST BP <80 MM HG: CPT | Performed by: PHYSICIAN ASSISTANT

## 2023-03-06 PROCEDURE — 99211 OFF/OP EST MAY X REQ PHY/QHP: CPT

## 2023-03-06 PROCEDURE — 3074F SYST BP LT 130 MM HG: CPT | Performed by: PHYSICIAN ASSISTANT

## 2023-03-06 PROCEDURE — 99213 OFFICE O/P EST LOW 20 MIN: CPT | Performed by: PHYSICIAN ASSISTANT

## 2023-03-06 NOTE — PROGRESS NOTES
Patient Name:  Jinny Michelle  Patient :  1960  Patient MRN:  [id number]     Primary Oncologist: Sophia Colvin MD  Referring Provider: Eual Schirmer, DO     Date of Service: 3/6/2023       Chief Complaint:    Chief Complaint   Patient presents with    Follow-up     She came in for follow up visit. Patient Active Problem List:     Type 2 diabetes mellitus with hyperglycemia, without long-term current use of insulin (HCC)     Essential hypertension     Hypothyroidism     Hyperlipidemia     Fullness of supraclavicular fossa     Chronic rhinitis     HPI:   Will Mortensen is a pleasant 65year old female patient who was referred for evaluation of elevated Hg. Mammogram in 2022 benign. She has cologuard in 2022.  2020 CBC unremarkable  2021 WBC 6.7, Hg 16.1, HCT 47.1, RBC 5.31. Plat 218.    2022 ALT 47. WBC 6.2, Hg 16.4, HCT 48.5, RBC 5.38. Plat 211.  10/2022 ALT 35. Total bili 1.3. TSH and free T4 wnl. WBC 7.3, Hg 16.8, HCT 51.8, , plat 211. She has been taking HCTZ for years and drinking enough water. I discussed with her the potential etiologies of polycythemia. She has been reviewing from the Internet. She is agreeable to have work-up for polycythemia including CT abdomen and pelvis to rule out hypernephroma, polycystic kidney disease and etc.    Reportedly she does not snore at the nighttime. She lost about 55 pounds over 1 year intentionally. I reviewed her medication list.  In 2022 she started vitamin for the vision as recommended by the eye doctor. 2022 she came in for follow up visit. Mammogram 2022 benign. 10/2022 ALT 35, total bili 1.3. Hg 16.8, HCT 51.8,    2022 Hg 16.3, HCT 49.6. Epo 15. Flow cytometry, BCR ABL study from peripheral blood in 2022 unremarkable. TATIANA-2 with reflex unremarkable. CT abdomen 2022  1. Cirrhosis with stigmata of portal venous hypertension.     She likely has VELASQUEZ, I referred to GI for further evaluation of liver cirrhosis. She takes baby ASA. 2023 US Abdomen Limited  1. Cirrhosis, without a discrete intrahepatic nodule or mass. 2. Cholelithiasis, without sonographic evidence of cholecystitis. 3. Unremarkable sonographic appearance of the common bile duct, right kidney,   and visualized pancreas. 2023  WBC 8.2, Hgb 16.3, Hct 49.5, MCV 91, Plt 232k  CMP with Tbili 1.1, otherwise unremarkable  BCR-ABL Neg  JAK2, CALR, MPL neg    3/6/2023 FU    Pt seen and examined in clinic. Was evaluated by GI and was recommended to have EGD given cirrhosis on imaging. She declined secondary to cost, was discussed in depth by both GI and myself regarding risks of cirrhosis. Liver RAYGOZA ordered by GI, if no other cause likely r/t fatty liver. Encouraged further weight loss. No acute pain. Denied any nausea, vomiting or diarrhea. No fever or chills. No chest pain, shortness of breath or palpitation. No headache or dizzy spell. No specific bone pain. No melena or hematochezia. Denied any dysuria or hematuria. Past Medical History:   Diagnosis Date    Allergic rhinitis     Anemia     Chronic rhinitis     Diabetes mellitus ([address]     History of COVID-19     Hyperlipidemia     Hypertension     Hypothyroid     Morbid obesity ([address]     Osteoarthrosis     Tricompartmental arthritis of the left knee    Radiculopathy, cervical region     Sensorimotor neuropathy 2014    low grade of Upper Extremities    Stricture and stenosis of cervix uteri      Past Surgical History:   Procedure Laterality Date    BACK SURGERY       SECTION      DILATION AND CURETTAGE OF UTERUS      ENDOMETRIAL ABLATION      SHOULDER ARTHROSCOPY Right 2022    RIGHT SHOULDER ARTHROSCOPY ROTATOR CUFF REPAIR SUBACROMIAL DECOMPRESSION performed by Tania Miles MD at [address] [place] [address] History:   She never smokes. Denied any alcohol consumption. She has 1 child. Family History: Mother  of lung cancer.      Review of Systems: The remainder of ROS is unremarkable. Vital Signs: BP (!) 160/91 (Site: Right Upper Arm, Position: Sitting, Cuff Size: Medium Adult)   Pulse (!) 103   Temp 97.6 °F (36.4 °C) (Infrared)   Ht 5' 3\" (1.6 m)   Wt 297 lb 9.6 oz (135 kg)   SpO2 93%   BMI 52.72 kg/m²      Physical Exam:  CONSTITUTIONAL: awake, alert, cooperative, no apparent distress, obese  EYES: pupils equal, round and reactive to light. Sclera clear and conjunctiva normal  ENT: Normocephalic, without obvious abnormality, atraumatic  NECK: supple, symmetrical, no jugular venous distension and no carotid bruits   HEMATOLOGIC/LYMPHATIC: no cervical, supraclavicular or axillary lymphadenopathy   LUNGS: no increased work of breathing and clear to auscultation   CARDIOVASCULAR: regular rate and rhythm, normal S1 and S2, no murmur noted  ABDOMEN: normal bowel sound, soft, non-distended, non-tender, no masses palpated, no rebound or guarding. MUSCULOSKELETAL: full range of motion noted, tone is normal  NEUROLOGIC: Motor skills grossly intact. SKIN: Normal skin color, texture, turgor and no jaundice. appears intact   EXTREMITIES: no LE edema or cyanosis.      Labs:  Hematology:  Lab Results   Component Value Date    WBC 8.2 02/27/2023    RBC 5.44 (H) 02/27/2023    HGB 16.3 (H) 02/27/2023    HCT 49.5 (H) 02/27/2023    MCV 91.0 02/27/2023    MCH 30.0 02/27/2023    MCHC 32.9 02/27/2023    RDW 13.6 02/27/2023     02/27/2023    MPV 10.3 02/27/2023    SEGSPCT 51.8 02/27/2023    EOSRELPCT 2.6 02/27/2023    BASOPCT 0.9 02/27/2023    LYMPHOPCT 36.1 02/27/2023    MONOPCT 7.9 (H) 02/27/2023    SEGSABS 4.2 02/27/2023    EOSABS 0.2 02/27/2023    BASOSABS 0.1 02/27/2023    LYMPHSABS 2.9 02/27/2023    MONOSABS 0.6 02/27/2023    DIFFTYPE AUTOMATED DIFFERENTIAL 02/27/2023    ANISOCYTOSIS 1+ 01/10/2013     Chemistry:  Lab Results   Component Value Date     02/27/2023    K 3.8 02/27/2023    CL 96 (L) 02/27/2023    CO2 29 02/27/2023    BUN 14 02/27/2023    CREATININE 0.6 02/27/2023    GLUCOSE 160 (H) 02/27/2023    CALCIUM 10.1 02/27/2023    PROT 7.6 02/27/2023    LABALBU 4.6 02/27/2023    BILITOT 1.1 (H) 02/27/2023    ALKPHOS 78 02/27/2023    AST 25 02/27/2023    ALT 34 02/27/2023    LABGLOM >60 02/27/2023    GFRAA >60 04/04/2022    AGRATIO 1.7 06/09/2022    GLOB 2.8 06/09/2022    POCGLU 102 (H) 04/06/2022     Lab Results   Component Value Date    TSHHS 2.864 01/10/2013    T4FREE 1.51 10/13/2022    FT3 3.0 03/18/2011     Immunology:  Lab Results   Component Value Date    PROT 7.6 02/27/2023     Coagulation Panel:  Lab Results   Component Value Date    PROTIME 10.9 12/29/2013    INR 1.01 12/29/2013    APTT 27.2 12/29/2013    DDIMER <200 12/29/2013      Observations:  No data recorded     Assessment & Plan:  1. She was referred for evaluation of elevated Hg. we discussed about potential etiologies of polycythemia. Tarik-2 with reflex negative. Flow and BCR Abl study unremarkable. O2 sat on RA wnl. I recommend to drink enough water. I will continue to monitor CBC. I may consider bone marrow study if she has worsening erythrocytosis.  -Feb 2023 stable erythrocytosis    2. Mammogram in June 2022 benign. She has cologuard in 2/2022.    3.  She is diabetic. She lost weight intentionally. Encouraged to continue. 4. Incidentally CT abdomen showed liver cirrhosis. ? VELASQUEZ. -following with GI. She has not had recommended workup secondary to cost.  Discussed importance. -continue weight loss    She wishes to limit appts and labs to what is necessary. Return to clinic in 6 months with repeat labs. All of her questions been answered for today. I have discussed the above stated plan with the patient and they verbalized understanding and agreed with the plan. Luna Coleman PA-C    Portions of this note are copied forward from previous clinic note.   Interval history, ROS, physical exam, assessment and plan has been reviewed and updated for accuracy by this provider.

## 2023-03-06 NOTE — PROGRESS NOTES
MA Rooming Questions  Patient: Whitney May  MRN: [id number]    Date: 3/6/2023        1. Do you have any new issues?   no         2. Do you need any refills on medications?    no    3. Have you had any imaging done since your last visit? yes - u/s 2/22    4. Have you been hospitalized or seen in the emergency room since your last visit here?   no    5. Did the patient have a depression screening completed today?  No    No data recorded     PHQ-9 Given to (if applicable):               PHQ-9 Score (if applicable):                     [] Positive     []  Negative              Does question #9 need addressed (if applicable)                     [] Yes    []  No               Blima Feeling, CMA

## 2023-03-13 RX ORDER — PAROXETINE 10 MG/1
10 TABLET, FILM COATED ORAL EVERY MORNING
Qty: 90 TABLET | Refills: 1 | Status: SHIPPED | OUTPATIENT
Start: 2023-03-13

## 2023-03-24 LAB
ANA TITER: NEGATIVE
AVERAGE GLUCOSE: NORMAL
BASOPHILS ABSOLUTE: 0.1 /ΜL
BASOPHILS RELATIVE PERCENT: 1 %
EOSINOPHILS ABSOLUTE: 0.1 /ΜL
EOSINOPHILS RELATIVE PERCENT: 2 %
HBA1C MFR BLD: 8.2 %
HCT VFR BLD CALC: 47.8 % (ref 36–46)
HEMOGLOBIN: 16.5 G/DL (ref 12–16)
LYMPHOCYTES ABSOLUTE: 2 /ΜL
LYMPHOCYTES RELATIVE PERCENT: 31 %
MCH RBC QN AUTO: 30 PG
MCHC RBC AUTO-ENTMCNC: 34.5 G/DL
MCV RBC AUTO: 87 FL
MONOCYTES ABSOLUTE: 0.5 /ΜL
MONOCYTES RELATIVE PERCENT: 7 %
NEUTROPHILS ABSOLUTE: 3.8 /ΜL
NEUTROPHILS RELATIVE PERCENT: 58 %
PDW BLD-RTO: ABNORMAL %
PLATELET # BLD: 206 K/ΜL
PMV BLD AUTO: ABNORMAL FL
RBC # BLD: 5.5 10^6/ΜL
WBC # BLD: 6.5 10^3/ML

## 2023-03-25 LAB — HBA1C MFR BLD: 8.2 % (ref 4.8–5.6)

## 2023-03-27 DIAGNOSIS — E11.65 TYPE 2 DIABETES MELLITUS WITH HYPERGLYCEMIA, WITHOUT LONG-TERM CURRENT USE OF INSULIN (HCC): ICD-10-CM

## 2023-03-28 DIAGNOSIS — E11.65 TYPE 2 DIABETES MELLITUS WITH HYPERGLYCEMIA, WITHOUT LONG-TERM CURRENT USE OF INSULIN (HCC): ICD-10-CM

## 2023-03-28 RX ORDER — PRAVASTATIN SODIUM 40 MG
40 TABLET ORAL DAILY
Qty: 90 TABLET | Refills: 1 | Status: SHIPPED | OUTPATIENT
Start: 2023-03-28

## 2023-03-28 RX ORDER — ORAL SEMAGLUTIDE 14 MG/1
1 TABLET ORAL DAILY
Qty: 90 TABLET | Refills: 1 | Status: SHIPPED | OUTPATIENT
Start: 2023-03-28

## 2023-04-15 DIAGNOSIS — I10 ESSENTIAL HYPERTENSION: ICD-10-CM

## 2023-04-15 SDOH — ECONOMIC STABILITY: FOOD INSECURITY: WITHIN THE PAST 12 MONTHS, THE FOOD YOU BOUGHT JUST DIDN'T LAST AND YOU DIDN'T HAVE MONEY TO GET MORE.: NEVER TRUE

## 2023-04-15 SDOH — ECONOMIC STABILITY: HOUSING INSECURITY
IN THE LAST 12 MONTHS, WAS THERE A TIME WHEN YOU DID NOT HAVE A STEADY PLACE TO SLEEP OR SLEPT IN A SHELTER (INCLUDING NOW)?: NO

## 2023-04-15 SDOH — ECONOMIC STABILITY: FOOD INSECURITY: WITHIN THE PAST 12 MONTHS, YOU WORRIED THAT YOUR FOOD WOULD RUN OUT BEFORE YOU GOT MONEY TO BUY MORE.: NEVER TRUE

## 2023-04-15 SDOH — ECONOMIC STABILITY: INCOME INSECURITY: HOW HARD IS IT FOR YOU TO PAY FOR THE VERY BASICS LIKE FOOD, HOUSING, MEDICAL CARE, AND HEATING?: NOT HARD AT ALL

## 2023-04-17 RX ORDER — LOSARTAN POTASSIUM 100 MG/1
100 TABLET ORAL DAILY
Qty: 90 TABLET | Refills: 0 | Status: SHIPPED | OUTPATIENT
Start: 2023-04-17

## 2023-04-18 ENCOUNTER — OFFICE VISIT (OUTPATIENT)
Dept: INTERNAL MEDICINE CLINIC | Age: 63
End: 2023-04-18
Payer: COMMERCIAL

## 2023-04-18 VITALS
HEIGHT: 63 IN | SYSTOLIC BLOOD PRESSURE: 138 MMHG | DIASTOLIC BLOOD PRESSURE: 100 MMHG | WEIGHT: 293 LBS | OXYGEN SATURATION: 98 % | BODY MASS INDEX: 51.91 KG/M2 | HEART RATE: 82 BPM

## 2023-04-18 DIAGNOSIS — I10 ESSENTIAL HYPERTENSION: ICD-10-CM

## 2023-04-18 DIAGNOSIS — E03.9 HYPOTHYROIDISM, UNSPECIFIED TYPE: ICD-10-CM

## 2023-04-18 DIAGNOSIS — E78.5 HYPERLIPIDEMIA, UNSPECIFIED HYPERLIPIDEMIA TYPE: ICD-10-CM

## 2023-04-18 DIAGNOSIS — K74.60 HEPATIC CIRRHOSIS, UNSPECIFIED HEPATIC CIRRHOSIS TYPE, UNSPECIFIED WHETHER ASCITES PRESENT (HCC): ICD-10-CM

## 2023-04-18 DIAGNOSIS — E11.65 TYPE 2 DIABETES MELLITUS WITH HYPERGLYCEMIA, WITHOUT LONG-TERM CURRENT USE OF INSULIN (HCC): Primary | ICD-10-CM

## 2023-04-18 PROCEDURE — 3052F HG A1C>EQUAL 8.0%<EQUAL 9.0%: CPT | Performed by: FAMILY MEDICINE

## 2023-04-18 PROCEDURE — 3075F SYST BP GE 130 - 139MM HG: CPT | Performed by: FAMILY MEDICINE

## 2023-04-18 PROCEDURE — 3080F DIAST BP >= 90 MM HG: CPT | Performed by: FAMILY MEDICINE

## 2023-04-18 PROCEDURE — 99214 OFFICE O/P EST MOD 30 MIN: CPT | Performed by: FAMILY MEDICINE

## 2023-04-18 ASSESSMENT — PATIENT HEALTH QUESTIONNAIRE - PHQ9
SUM OF ALL RESPONSES TO PHQ QUESTIONS 1-9: 0
1. LITTLE INTEREST OR PLEASURE IN DOING THINGS: 0
SUM OF ALL RESPONSES TO PHQ QUESTIONS 1-9: 0
SUM OF ALL RESPONSES TO PHQ9 QUESTIONS 1 & 2: 0
2. FEELING DOWN, DEPRESSED OR HOPELESS: 0

## 2023-04-18 ASSESSMENT — ENCOUNTER SYMPTOMS
ABDOMINAL PAIN: 0
NAUSEA: 0
SHORTNESS OF BREATH: 0
BACK PAIN: 0
COUGH: 0

## 2023-04-18 NOTE — PROGRESS NOTES
supple. Right lower leg: No edema. Left lower leg: No edema. Neurological:      Mental Status: She is alert and oriented to person, place, and time. Psychiatric:         Mood and Affect: Mood normal.              An electronic signature was used to authenticate this note. --Bryanna Lala DO     This dictation was generated by voice recognition computer software. Although all attempts are made to edit the dictation for accuracy, there may be errors in the transcription that are not intended.

## 2023-05-03 LAB — DIABETIC RETINOPATHY: NEGATIVE

## 2023-05-08 DIAGNOSIS — E03.9 HYPOTHYROIDISM, UNSPECIFIED TYPE: ICD-10-CM

## 2023-05-08 RX ORDER — LEVOTHYROXINE SODIUM 175 UG/1
175 TABLET ORAL DAILY
Qty: 90 TABLET | Refills: 1 | Status: SHIPPED | OUTPATIENT
Start: 2023-05-08

## 2023-05-21 DIAGNOSIS — E11.65 TYPE 2 DIABETES MELLITUS WITH HYPERGLYCEMIA, WITHOUT LONG-TERM CURRENT USE OF INSULIN (HCC): ICD-10-CM

## 2023-05-22 RX ORDER — HYDROCHLOROTHIAZIDE 25 MG/1
50 TABLET ORAL DAILY
Qty: 180 TABLET | Refills: 1 | Status: SHIPPED | OUTPATIENT
Start: 2023-05-22

## 2023-05-22 RX ORDER — GLYBURIDE 5 MG/1
TABLET ORAL
Qty: 270 TABLET | Refills: 1 | Status: SHIPPED | OUTPATIENT
Start: 2023-05-22

## 2023-05-22 RX ORDER — AMLODIPINE BESYLATE 5 MG/1
5 TABLET ORAL DAILY
Qty: 90 TABLET | Refills: 1 | Status: SHIPPED | OUTPATIENT
Start: 2023-05-22

## 2023-06-01 ENCOUNTER — TELEPHONE (OUTPATIENT)
Dept: INTERNAL MEDICINE CLINIC | Age: 63
End: 2023-06-01

## 2023-06-01 DIAGNOSIS — E11.65 TYPE 2 DIABETES MELLITUS WITH HYPERGLYCEMIA, WITHOUT LONG-TERM CURRENT USE OF INSULIN (HCC): ICD-10-CM

## 2023-06-02 RX ORDER — ORAL SEMAGLUTIDE 14 MG/1
1 TABLET ORAL DAILY
Qty: 90 TABLET | Refills: 0 | Status: SHIPPED | OUTPATIENT
Start: 2023-06-02

## 2023-07-05 RX ORDER — MONTELUKAST SODIUM 10 MG/1
10 TABLET ORAL DAILY
Qty: 90 TABLET | Refills: 1 | Status: SHIPPED | OUTPATIENT
Start: 2023-07-05

## 2023-07-13 LAB
ALBUMIN SERPL-MCNC: 4.7 G/DL (ref 3.9–4.9)
ALP BLD-CCNC: 76 IU/L (ref 44–121)
ALT SERPL-CCNC: 30 IU/L (ref 0–32)
AST SERPL-CCNC: 24 IU/L (ref 0–40)
BILIRUB SERPL-MCNC: 0.8 MG/DL (ref 0–1.2)
BILIRUBIN DIRECT: 0.24 MG/DL (ref 0–0.4)
HBA1C MFR BLD: 8.2 % (ref 4.8–5.6)
TOTAL PROTEIN: 7.4 G/DL (ref 6–8.5)

## 2023-07-14 RX ORDER — PRAVASTATIN SODIUM 40 MG
40 TABLET ORAL DAILY
Qty: 90 TABLET | Refills: 1 | Status: SHIPPED | OUTPATIENT
Start: 2023-07-14 | End: 2023-08-12 | Stop reason: SDUPTHER

## 2023-07-15 DIAGNOSIS — I10 ESSENTIAL HYPERTENSION: ICD-10-CM

## 2023-07-17 RX ORDER — LOSARTAN POTASSIUM 100 MG/1
TABLET ORAL
Qty: 90 TABLET | Refills: 0 | Status: SHIPPED | OUTPATIENT
Start: 2023-07-17

## 2023-07-18 ENCOUNTER — OFFICE VISIT (OUTPATIENT)
Dept: INTERNAL MEDICINE CLINIC | Age: 63
End: 2023-07-18
Payer: COMMERCIAL

## 2023-07-18 VITALS
OXYGEN SATURATION: 96 % | BODY MASS INDEX: 51.91 KG/M2 | SYSTOLIC BLOOD PRESSURE: 136 MMHG | WEIGHT: 293 LBS | HEIGHT: 63 IN | DIASTOLIC BLOOD PRESSURE: 82 MMHG | HEART RATE: 83 BPM

## 2023-07-18 DIAGNOSIS — E78.5 HYPERLIPIDEMIA, UNSPECIFIED HYPERLIPIDEMIA TYPE: ICD-10-CM

## 2023-07-18 DIAGNOSIS — E11.65 TYPE 2 DIABETES MELLITUS WITH HYPERGLYCEMIA, WITHOUT LONG-TERM CURRENT USE OF INSULIN (HCC): Primary | ICD-10-CM

## 2023-07-18 DIAGNOSIS — Z12.31 SCREENING MAMMOGRAM FOR BREAST CANCER: ICD-10-CM

## 2023-07-18 DIAGNOSIS — I10 ESSENTIAL HYPERTENSION: ICD-10-CM

## 2023-07-18 DIAGNOSIS — D75.1 ERYTHROCYTOSIS: ICD-10-CM

## 2023-07-18 DIAGNOSIS — E03.9 HYPOTHYROIDISM, UNSPECIFIED TYPE: ICD-10-CM

## 2023-07-18 PROBLEM — E11.9 TYPE 2 DIABETES MELLITUS (HCC): Status: ACTIVE | Noted: 2023-07-18

## 2023-07-18 PROCEDURE — 3078F DIAST BP <80 MM HG: CPT | Performed by: FAMILY MEDICINE

## 2023-07-18 PROCEDURE — 3074F SYST BP LT 130 MM HG: CPT | Performed by: FAMILY MEDICINE

## 2023-07-18 PROCEDURE — 99214 OFFICE O/P EST MOD 30 MIN: CPT | Performed by: FAMILY MEDICINE

## 2023-07-18 PROCEDURE — 3052F HG A1C>EQUAL 8.0%<EQUAL 9.0%: CPT | Performed by: FAMILY MEDICINE

## 2023-07-18 RX ORDER — INSULIN GLARGINE-YFGN 100 [IU]/ML
INJECTION, SOLUTION SUBCUTANEOUS
Qty: 15 ML | Refills: 4 | Status: SHIPPED | OUTPATIENT
Start: 2023-07-18

## 2023-07-18 ASSESSMENT — ENCOUNTER SYMPTOMS
BACK PAIN: 0
NAUSEA: 0
ABDOMINAL PAIN: 0
COUGH: 0
SHORTNESS OF BREATH: 0

## 2023-07-18 NOTE — PROGRESS NOTES
Sarahy Lindsay (:  1960) is a 59 y.o. female,established patient, here for evaluation of the following chief complaint(s):  Follow-up, Diabetes, and Hypertension         ASSESSMENT/PLAN:  1. Type 2 diabetes mellitus with hyperglycemia, without long-term current use of insulin (HCC)    -Start insulin glargine  - Insulin Glargine-yfgn 100 UNIT/ML SOPN; Inject 10 units in to the skin and titrate as directed every evening. Max 50 units  Dispense: 15 mL; Refill: 4  ADR's explained  - Insulin Pen Needle 32G X 4 MM MISC; 1 each by Does not apply route daily  Dispense: 100 each; Refill: 4  - Hemoglobin A1C; Future  - Basic Metabolic Panel; Future  - Microalbumin / Creatinine Urine Ratio; Future  - Urinalysis with Reflex to Culture; Future  On Jardiance, glyburide, metformin, Rybelsus  Monitor blood sugar. Patient will need to adjust glyburide as directed    2. Essential hypertension  Continue amlodipine, losartan, and hydrochlorothiazide    3. Hypothyroidism, unspecified type  Continue levothyroxine 175 mcg  - T4, Free; Future  - TSH; Future    4. Hyperlipidemia, unspecified hyperlipidemia type  Continue pravastatin 10 mg  - Lipid Panel; Future    5. Erythrocytosis  - CBC with Auto Differential; Future    6. Screening mammogram for breast cancer  - [facility] MEKHI DIGITAL SCREEN BILATERAL PER PROTOCOL;  Future    Return in about 5 months (around 2023) for annual exam.       Lab Results   Component Value Date    WBC 6.5 2023    WBC 6.5 2023    HGB 16.5 (A) 2023    HGB 16.5 (A) 2023    HCT 47.8 (A) 2023    HCT 47.8 (A) 2023    MCV 87 2023    MCV 87 2023     2023     2023     Lab Results   Component Value Date    CHOL 161 10/13/2022     Lab Results   Component Value Date    TRIG 182 (H) 10/13/2022     Lab Results   Component Value Date    HDL 44 10/13/2022     Lab Results   Component Value Date    LDLCALC 86 10/13/2022    LDLDIRECT 126 (H)

## 2023-07-27 ENCOUNTER — HOSPITAL ENCOUNTER (OUTPATIENT)
Dept: WOMENS IMAGING | Age: 63
Discharge: HOME | End: 2023-07-27
Payer: COMMERCIAL

## 2023-07-27 DIAGNOSIS — Z12.31 SCREENING MAMMOGRAM FOR BREAST CANCER: ICD-10-CM

## 2023-07-27 PROCEDURE — 77063 BREAST TOMOSYNTHESIS BI: CPT

## 2023-08-12 DIAGNOSIS — E11.65 TYPE 2 DIABETES MELLITUS WITH HYPERGLYCEMIA, WITHOUT LONG-TERM CURRENT USE OF INSULIN (HCC): ICD-10-CM

## 2023-08-14 RX ORDER — PRAVASTATIN SODIUM 40 MG
40 TABLET ORAL DAILY
Qty: 90 TABLET | Refills: 1 | Status: SHIPPED | OUTPATIENT
Start: 2023-08-14

## 2023-08-14 RX ORDER — GLYBURIDE 5 MG/1
TABLET ORAL
Qty: 270 TABLET | Refills: 1 | Status: SHIPPED | OUTPATIENT
Start: 2023-08-14

## 2023-09-06 DIAGNOSIS — I10 ESSENTIAL HYPERTENSION: ICD-10-CM

## 2023-09-07 RX ORDER — MONTELUKAST SODIUM 10 MG/1
10 TABLET ORAL DAILY
Qty: 90 TABLET | Refills: 1 | Status: SHIPPED | OUTPATIENT
Start: 2023-09-07

## 2023-09-07 RX ORDER — AMLODIPINE BESYLATE 5 MG/1
5 TABLET ORAL DAILY
Qty: 90 TABLET | Refills: 1 | Status: SHIPPED | OUTPATIENT
Start: 2023-09-07

## 2023-09-07 RX ORDER — HYDROCHLOROTHIAZIDE 25 MG/1
50 TABLET ORAL DAILY
Qty: 180 TABLET | Refills: 1 | Status: SHIPPED | OUTPATIENT
Start: 2023-09-07

## 2023-09-07 RX ORDER — LOSARTAN POTASSIUM 100 MG/1
100 TABLET ORAL DAILY
Qty: 90 TABLET | Refills: 1 | Status: SHIPPED | OUTPATIENT
Start: 2023-09-07

## 2023-09-11 RX ORDER — PAROXETINE 10 MG/1
10 TABLET, FILM COATED ORAL EVERY MORNING
Qty: 90 TABLET | Refills: 1 | Status: SHIPPED | OUTPATIENT
Start: 2023-09-11

## 2023-10-24 DIAGNOSIS — I10 ESSENTIAL HYPERTENSION: ICD-10-CM

## 2023-10-24 DIAGNOSIS — E11.65 TYPE 2 DIABETES MELLITUS WITH HYPERGLYCEMIA, WITHOUT LONG-TERM CURRENT USE OF INSULIN (HCC): ICD-10-CM

## 2023-10-25 RX ORDER — LOSARTAN POTASSIUM 100 MG/1
100 TABLET ORAL DAILY
Qty: 90 TABLET | Refills: 1 | Status: SHIPPED | OUTPATIENT
Start: 2023-10-25

## 2023-10-31 DIAGNOSIS — E11.65 TYPE 2 DIABETES MELLITUS WITH HYPERGLYCEMIA, WITHOUT LONG-TERM CURRENT USE OF INSULIN (HCC): ICD-10-CM

## 2023-10-31 RX ORDER — ORAL SEMAGLUTIDE 14 MG/1
1 TABLET ORAL DAILY
Qty: 90 TABLET | Refills: 0 | Status: SHIPPED | OUTPATIENT
Start: 2023-10-31

## 2023-11-07 DIAGNOSIS — E11.65 TYPE 2 DIABETES MELLITUS WITH HYPERGLYCEMIA, WITHOUT LONG-TERM CURRENT USE OF INSULIN (HCC): ICD-10-CM

## 2023-12-08 LAB
BASOPHILS ABSOLUTE: 0.1 X10E3/UL (ref 0–0.2)
BASOPHILS RELATIVE PERCENT: 1 %
BUN / CREAT RATIO: 23 (ref 12–28)
BUN BLDV-MCNC: 17 MG/DL (ref 8–27)
CALCIUM SERPL-MCNC: 10.1 MG/DL (ref 8.7–10.3)
CHLORIDE BLD-SCNC: 99 MMOL/L (ref 96–106)
CHOLESTEROL, TOTAL: 166 MG/DL (ref 100–199)
CO2: 20 MMOL/L (ref 20–29)
COMMENT: ABNORMAL
CREAT SERPL-MCNC: 0.74 MG/DL (ref 0.57–1)
CREATININE URINE: 45.4 MG/DL
EOSINOPHILS ABSOLUTE: 0.2 X10E3/UL (ref 0–0.4)
EOSINOPHILS RELATIVE PERCENT: 3 %
ERYTHROCYTES, NUCLEATED/100 LEU: ABNORMAL
ESTIMATED GLOMERULAR FILTRATION RATE CREATININE EQUATION: 91 ML/MIN/1.73
GLUCOSE BLD-MCNC: 152 MG/DL (ref 70–99)
HBA1C MFR BLD: 7.8 % (ref 4.8–5.6)
HCT VFR BLD CALC: 48.4 % (ref 34–46.6)
HDLC SERPL-MCNC: 48 MG/DL
HEMOGLOBIN: 16.4 G/DL (ref 11.1–15.9)
IMMATURE CELLS ABSOLUTE COUNT: ABNORMAL
IMMATURE GRANS (ABS): 0 X10E3/UL (ref 0–0.1)
IMMATURE GRANULOCYTES: 1 %
LDL CHOLESTEROL CALCULATED: 86 MG/DL (ref 0–99)
LYMPHOCYTES ABSOLUTE: 1.9 X10E3/UL (ref 0.7–3.1)
LYMPHOCYTES RELATIVE PERCENT: 31 %
MCH RBC QN AUTO: 29.7 PG (ref 26.6–33)
MCHC RBC AUTO-ENTMCNC: 33.9 G/DL (ref 31.5–35.7)
MCV RBC AUTO: 88 FL (ref 79–97)
MICROALBUMIN UR-MCNC: 22.3 UG/ML
MICROALBUMIN/CREAT UR-RTO: 49 MG/G CREAT (ref 0–29)
MONOCYTES ABSOLUTE: 0.6 X10E3/UL (ref 0.1–0.9)
MONOCYTES RELATIVE PERCENT: 10 %
MORPHOLOGY: ABNORMAL
NEUTROPHILS ABSOLUTE: 3.3 X10E3/UL (ref 1.4–7)
PDW BLD-RTO: 13.1 % (ref 11.7–15.4)
PLATELET # BLD: 208 X10E3/UL (ref 150–450)
POTASSIUM SERPL-SCNC: 4.3 MMOL/L (ref 3.5–5.2)
RBC # BLD: 5.53 X10E6/UL (ref 3.77–5.28)
SEGMENTED NEUTROPHILS RELATIVE PERCENT: 54 %
SODIUM BLD-SCNC: 141 MMOL/L (ref 134–144)
T4 FREE: 1.57 NG/DL (ref 0.82–1.77)
TRIGL SERPL-MCNC: 187 MG/DL (ref 0–149)
TSH SERPL DL<=0.05 MIU/L-ACNC: 1.9 UIU/ML (ref 0.45–4.5)
VLDLC SERPL CALC-MCNC: 32 MG/DL (ref 5–40)
WBC # BLD: 6.1 X10E3/UL (ref 3.4–10.8)

## 2023-12-10 DIAGNOSIS — E03.9 HYPOTHYROIDISM, UNSPECIFIED TYPE: ICD-10-CM

## 2023-12-11 RX ORDER — LEVOTHYROXINE SODIUM 175 UG/1
175 TABLET ORAL DAILY
Qty: 90 TABLET | Refills: 1 | Status: SHIPPED | OUTPATIENT
Start: 2023-12-11

## 2023-12-13 ENCOUNTER — OFFICE VISIT (OUTPATIENT)
Dept: INTERNAL MEDICINE CLINIC | Age: 63
End: 2023-12-13
Payer: COMMERCIAL

## 2023-12-13 VITALS
DIASTOLIC BLOOD PRESSURE: 78 MMHG | SYSTOLIC BLOOD PRESSURE: 124 MMHG | OXYGEN SATURATION: 98 % | BODY MASS INDEX: 51.91 KG/M2 | WEIGHT: 293 LBS | HEIGHT: 63 IN | HEART RATE: 87 BPM

## 2023-12-13 DIAGNOSIS — Z00.00 ANNUAL PHYSICAL EXAM: Primary | ICD-10-CM

## 2023-12-13 DIAGNOSIS — E03.9 HYPOTHYROIDISM, UNSPECIFIED TYPE: ICD-10-CM

## 2023-12-13 DIAGNOSIS — J31.0 CHRONIC RHINITIS: ICD-10-CM

## 2023-12-13 DIAGNOSIS — I10 ESSENTIAL HYPERTENSION: ICD-10-CM

## 2023-12-13 DIAGNOSIS — E11.65 TYPE 2 DIABETES MELLITUS WITH HYPERGLYCEMIA, WITHOUT LONG-TERM CURRENT USE OF INSULIN (HCC): ICD-10-CM

## 2023-12-13 DIAGNOSIS — E78.5 HYPERLIPIDEMIA, UNSPECIFIED HYPERLIPIDEMIA TYPE: ICD-10-CM

## 2023-12-13 DIAGNOSIS — K74.60 HEPATIC CIRRHOSIS, UNSPECIFIED HEPATIC CIRRHOSIS TYPE, UNSPECIFIED WHETHER ASCITES PRESENT (HCC): ICD-10-CM

## 2023-12-13 PROCEDURE — 99396 PREV VISIT EST AGE 40-64: CPT | Performed by: FAMILY MEDICINE

## 2023-12-13 PROCEDURE — 3078F DIAST BP <80 MM HG: CPT | Performed by: FAMILY MEDICINE

## 2023-12-13 PROCEDURE — 3074F SYST BP LT 130 MM HG: CPT | Performed by: FAMILY MEDICINE

## 2023-12-13 NOTE — PROGRESS NOTES
Health Maintenance Status  Immunization History   Administered Date(s) Administered    TDaP, ADACEL (age 6y-58y), [facility] (age 10y+), IM, 0.5mL 08/25/2019        Health Maintenance   Topic Date Due    Cervical cancer screen  Never done    Diabetic foot exam  02/15/2023    Hepatitis A vaccine (1 of 2 - Risk 2-dose series) 12/13/2024 (Originally 2/25/1980)    Hepatitis B vaccine (1 of 3 - Risk 3-dose series) 12/13/2024 (Originally 2/25/2021)    Flu vaccine (1) 12/13/2024 (Originally 8/1/2023)    Shingles vaccine (1 of 2) 12/13/2024 (Originally 2/25/2011)    Pneumococcal 0-64 years Vaccine (1 - PCV) 12/13/2024 (Originally 2/25/1967)    COVID-19 Vaccine (1) 12/13/2024 (Originally 1960)    Respiratory Syncytial Virus (RSV) Pregnant or age 61 yrs+ (1 - 1-dose 60+ series) 12/13/2024 (Originally 2/25/2021)    GFR test (Diabetes, CKD 3-4, OR last GFR 15-59)  02/27/2024    Depression Screen  04/18/2024    Breast cancer screen  07/27/2024    A1C test (Diabetic or Prediabetic)  12/07/2024    Diabetic Alb to Cr ratio (uACR) test  12/07/2024    Lipids  12/07/2024    Colorectal Cancer Screen  02/23/2025    Diabetic retinal exam  05/03/2025    DTaP/Tdap/Td vaccine (2 - Td or Tdap) 08/25/2029    Hepatitis C screen  Completed    Hib vaccine  Aged Out    Polio vaccine  Aged Out    Meningococcal (ACWY) vaccine  Aged Out    HIV screen  Discontinued     Recommendations for [employer] Due: see orders and patient instructions/AVS.    Return in about 4 months (around 4/13/2024) for Diabetes, HTN/foot exam.    This dictation was generated by voice recognition computer software. Although all attempts are made to edit the dictation for accuracy, there may be errors in the transcription that are not intended.

## 2024-02-02 DIAGNOSIS — E11.65 TYPE 2 DIABETES MELLITUS WITH HYPERGLYCEMIA, WITHOUT LONG-TERM CURRENT USE OF INSULIN (HCC): ICD-10-CM

## 2024-02-03 RX ORDER — ORAL SEMAGLUTIDE 14 MG/1
1 TABLET ORAL DAILY
Qty: 90 TABLET | Refills: 0 | Status: SHIPPED | OUTPATIENT
Start: 2024-02-03

## 2024-02-06 ENCOUNTER — TELEPHONE (OUTPATIENT)
Dept: INTERNAL MEDICINE CLINIC | Age: 64
End: 2024-02-06

## 2024-02-06 NOTE — TELEPHONE ENCOUNTER
Received PA request for Semaglutide (RYBELSUS) 14 MG TABS . DX  Type 2 diabetes mellitus with hyperglycemia, without long-term current use of insulin (HCC) [E11.65]     PA completed via epic

## 2024-02-14 ENCOUNTER — TELEPHONE (OUTPATIENT)
Dept: INTERNAL MEDICINE CLINIC | Age: 64
End: 2024-02-14

## 2024-02-20 ENCOUNTER — TELEPHONE (OUTPATIENT)
Dept: INTERNAL MEDICINE CLINIC | Age: 64
End: 2024-02-20

## 2024-02-20 NOTE — TELEPHONE ENCOUNTER
Delmy from [facility] called stating that they have received the appeal for the Rebellsys medication. Phone # [phone]

## 2024-03-04 DIAGNOSIS — E11.65 TYPE 2 DIABETES MELLITUS WITH HYPERGLYCEMIA, WITHOUT LONG-TERM CURRENT USE OF INSULIN (HCC): ICD-10-CM

## 2024-03-04 RX ORDER — GLYBURIDE 5 MG/1
TABLET ORAL
Qty: 270 TABLET | Refills: 1 | Status: SHIPPED | OUTPATIENT
Start: 2024-03-04

## 2024-03-04 RX ORDER — AMLODIPINE BESYLATE 5 MG/1
5 TABLET ORAL DAILY
Qty: 90 TABLET | Refills: 1 | Status: SHIPPED | OUTPATIENT
Start: 2024-03-04

## 2024-03-06 RX ORDER — PAROXETINE 10 MG/1
10 TABLET, FILM COATED ORAL EVERY MORNING
Qty: 90 TABLET | Refills: 1 | Status: SHIPPED | OUTPATIENT
Start: 2024-03-06

## 2024-03-07 ENCOUNTER — TELEPHONE (OUTPATIENT)
Dept: INTERNAL MEDICINE CLINIC | Age: 64
End: 2024-03-07

## 2024-03-07 NOTE — TELEPHONE ENCOUNTER
Pt still has been unable to get rybelsus and so she has been off of this and increased her insulin sense sge has not been taking rybelsus. Soraya thinks she has been taking 27 units he thinks

## 2024-03-13 DIAGNOSIS — E11.65 TYPE 2 DIABETES MELLITUS WITH HYPERGLYCEMIA, WITHOUT LONG-TERM CURRENT USE OF INSULIN (HCC): ICD-10-CM

## 2024-03-14 RX ORDER — ORAL SEMAGLUTIDE 14 MG/1
1 TABLET ORAL DAILY
Qty: 90 TABLET | Refills: 0 | Status: SHIPPED | OUTPATIENT
Start: 2024-03-14

## 2024-03-19 ENCOUNTER — TELEPHONE (OUTPATIENT)
Dept: INTERNAL MEDICINE CLINIC | Age: 64
End: 2024-03-19

## 2024-03-19 ENCOUNTER — PATIENT MESSAGE (OUTPATIENT)
Dept: INTERNAL MEDICINE CLINIC | Age: 64
End: 2024-03-19

## 2024-03-19 DIAGNOSIS — E11.65 TYPE 2 DIABETES MELLITUS WITH HYPERGLYCEMIA, WITHOUT LONG-TERM CURRENT USE OF INSULIN (HCC): ICD-10-CM

## 2024-03-19 RX ORDER — ORAL SEMAGLUTIDE 14 MG/1
1 TABLET ORAL DAILY
Qty: 90 TABLET | Refills: 1 | Status: SHIPPED | OUTPATIENT
Start: 2024-03-19

## 2024-03-19 NOTE — TELEPHONE ENCOUNTER
Can you send script again? PA was completed and denied. Appeal was done and denied as well. Need new script sent for new PA to be generated for the new insurance company.

## 2024-03-19 NOTE — TELEPHONE ENCOUNTER
Got a call from the patient, stated she is ready to start taking rybelsus again and [facility] called her and told her it was denied by insurance again.

## 2024-04-02 LAB
ESTIMATED AVERAGE GLUCOSE: NORMAL
HBA1C MFR BLD: 8.9 %

## 2024-04-03 DIAGNOSIS — E11.65 TYPE 2 DIABETES MELLITUS WITH HYPERGLYCEMIA, WITHOUT LONG-TERM CURRENT USE OF INSULIN (HCC): ICD-10-CM

## 2024-04-04 DIAGNOSIS — E11.65 TYPE 2 DIABETES MELLITUS WITH HYPERGLYCEMIA, WITHOUT LONG-TERM CURRENT USE OF INSULIN (HCC): ICD-10-CM

## 2024-04-04 RX ORDER — ORAL SEMAGLUTIDE 14 MG/1
1 TABLET ORAL DAILY
Qty: 90 TABLET | Refills: 1 | Status: SHIPPED | OUTPATIENT
Start: 2024-04-04

## 2024-04-06 ASSESSMENT — PATIENT HEALTH QUESTIONNAIRE - PHQ9
SUM OF ALL RESPONSES TO PHQ QUESTIONS 1-9: 0
SUM OF ALL RESPONSES TO PHQ QUESTIONS 1-9: 0
1. LITTLE INTEREST OR PLEASURE IN DOING THINGS: NOT AT ALL
2. FEELING DOWN, DEPRESSED OR HOPELESS: NOT AT ALL
SUM OF ALL RESPONSES TO PHQ QUESTIONS 1-9: 0
SUM OF ALL RESPONSES TO PHQ QUESTIONS 1-9: 0
2. FEELING DOWN, DEPRESSED OR HOPELESS: NOT AT ALL
SUM OF ALL RESPONSES TO PHQ9 QUESTIONS 1 & 2: 0
SUM OF ALL RESPONSES TO PHQ9 QUESTIONS 1 & 2: 0
1. LITTLE INTEREST OR PLEASURE IN DOING THINGS: NOT AT ALL

## 2024-04-08 ENCOUNTER — OFFICE VISIT (OUTPATIENT)
Dept: INTERNAL MEDICINE CLINIC | Age: 64
End: 2024-04-08
Payer: COMMERCIAL

## 2024-04-08 VITALS
SYSTOLIC BLOOD PRESSURE: 128 MMHG | HEART RATE: 82 BPM | HEIGHT: 63 IN | DIASTOLIC BLOOD PRESSURE: 82 MMHG | WEIGHT: 293 LBS | BODY MASS INDEX: 51.91 KG/M2 | OXYGEN SATURATION: 98 %

## 2024-04-08 DIAGNOSIS — N95.1 MENOPAUSAL SYNDROME: ICD-10-CM

## 2024-04-08 DIAGNOSIS — J31.0 CHRONIC RHINITIS: ICD-10-CM

## 2024-04-08 DIAGNOSIS — E11.65 TYPE 2 DIABETES MELLITUS WITH HYPERGLYCEMIA, WITHOUT LONG-TERM CURRENT USE OF INSULIN (HCC): Primary | ICD-10-CM

## 2024-04-08 DIAGNOSIS — E78.5 HYPERLIPIDEMIA, UNSPECIFIED HYPERLIPIDEMIA TYPE: ICD-10-CM

## 2024-04-08 DIAGNOSIS — I10 ESSENTIAL HYPERTENSION: ICD-10-CM

## 2024-04-08 DIAGNOSIS — E03.9 HYPOTHYROIDISM, UNSPECIFIED TYPE: ICD-10-CM

## 2024-04-08 PROCEDURE — 3052F HG A1C>EQUAL 8.0%<EQUAL 9.0%: CPT | Performed by: FAMILY MEDICINE

## 2024-04-08 PROCEDURE — 99214 OFFICE O/P EST MOD 30 MIN: CPT | Performed by: FAMILY MEDICINE

## 2024-04-08 PROCEDURE — 3074F SYST BP LT 130 MM HG: CPT | Performed by: FAMILY MEDICINE

## 2024-04-08 PROCEDURE — 3079F DIAST BP 80-89 MM HG: CPT | Performed by: FAMILY MEDICINE

## 2024-04-08 RX ORDER — PRAVASTATIN SODIUM 40 MG
40 TABLET ORAL DAILY
Qty: 90 TABLET | Refills: 1 | Status: SHIPPED | OUTPATIENT
Start: 2024-04-08

## 2024-04-08 RX ORDER — AMLODIPINE BESYLATE 5 MG/1
5 TABLET ORAL DAILY
Qty: 90 TABLET | Refills: 1 | Status: SHIPPED | OUTPATIENT
Start: 2024-04-08

## 2024-04-08 RX ORDER — ORAL SEMAGLUTIDE 14 MG/1
1 TABLET ORAL DAILY
Qty: 90 TABLET | Refills: 1 | Status: SHIPPED | OUTPATIENT
Start: 2024-04-08

## 2024-04-08 RX ORDER — LOSARTAN POTASSIUM 100 MG/1
100 TABLET ORAL DAILY
Qty: 90 TABLET | Refills: 1 | Status: SHIPPED | OUTPATIENT
Start: 2024-04-08

## 2024-04-08 RX ORDER — GLYBURIDE 5 MG/1
TABLET ORAL
Qty: 270 TABLET | Refills: 1 | Status: SHIPPED | OUTPATIENT
Start: 2024-04-08

## 2024-04-08 RX ORDER — MONTELUKAST SODIUM 10 MG/1
10 TABLET ORAL DAILY
Qty: 90 TABLET | Refills: 1 | Status: SHIPPED | OUTPATIENT
Start: 2024-04-08

## 2024-04-08 RX ORDER — PAROXETINE 10 MG/1
10 TABLET, FILM COATED ORAL EVERY MORNING
Qty: 90 TABLET | Refills: 1 | Status: SHIPPED | OUTPATIENT
Start: 2024-04-08

## 2024-04-08 RX ORDER — INSULIN GLARGINE-YFGN 100 [IU]/ML
INJECTION, SOLUTION SUBCUTANEOUS
Qty: 15 ML | Refills: 4 | Status: SHIPPED | OUTPATIENT
Start: 2024-04-08

## 2024-04-08 RX ORDER — LEVOTHYROXINE SODIUM 175 UG/1
175 TABLET ORAL DAILY
Qty: 90 TABLET | Refills: 1 | Status: SHIPPED | OUTPATIENT
Start: 2024-04-08

## 2024-04-08 RX ORDER — HYDROCHLOROTHIAZIDE 25 MG/1
50 TABLET ORAL DAILY
Qty: 180 TABLET | Refills: 1 | Status: SHIPPED | OUTPATIENT
Start: 2024-04-08

## 2024-04-08 ASSESSMENT — ENCOUNTER SYMPTOMS
COUGH: 0
ABDOMINAL PAIN: 0
NAUSEA: 0
SHORTNESS OF BREATH: 0

## 2024-04-08 NOTE — PROGRESS NOTES
PARoxetine (PAXIL) 10 MG tablet Take 1 tablet by mouth every morning 90 tablet 1    pravastatin (PRAVACHOL) 40 MG tablet Take 1 tablet by mouth daily 90 tablet 1    Semaglutide (RYBELSUS) 14 MG TABS Take 1 tablet by mouth daily 90 tablet 1    aspirin 81 MG EC tablet Take 1 tablet by mouth nightly      azelastine (ASTELIN) 0.1 % nasal spray 1 spray by Nasal route 2 times daily      fluticasone (FLONASE) 50 MCG/ACT nasal spray 1 spray by Nasal route 2 times daily       No current facility-administered medications for this visit.          Vitals:    04/08/24 0833   BP: 128/82   Site: Left Lower Arm   Position: Sitting   Cuff Size: Large Adult   Pulse: 82   SpO2: 98%   Weight: (!) 136.6 kg (301 lb 3.2 oz)   Height: 1.6 m (5' 3\")     Objective   Physical Exam  Vitals reviewed.   Constitutional:       General: She is not in acute distress.  Eyes:      Extraocular Movements: Extraocular movements intact.      Pupils: Pupils are equal, round, and reactive to light.   Cardiovascular:      Rate and Rhythm: Normal rate and regular rhythm.   Pulmonary:      Effort: Pulmonary effort is normal. No respiratory distress.      Breath sounds: Normal breath sounds.   Abdominal:      Palpations: Abdomen is soft.      Tenderness: There is no abdominal tenderness.   Musculoskeletal:      Cervical back: Neck supple.      Right lower leg: No edema.      Left lower leg: No edema.   Neurological:      Mental Status: She is alert and oriented to person, place, and time.   Psychiatric:         Mood and Affect: Mood normal.                An electronic signature was used to authenticate this note.    --Skylar Prescott,      This dictation was generated by voice recognition computer software.  Although all attempts are made to edit the dictation for accuracy, there may be errors in the transcription that are not intended.

## 2024-04-23 DIAGNOSIS — E11.65 TYPE 2 DIABETES MELLITUS WITH HYPERGLYCEMIA, WITHOUT LONG-TERM CURRENT USE OF INSULIN (HCC): ICD-10-CM

## 2024-04-24 RX ORDER — ORAL SEMAGLUTIDE 14 MG/1
1 TABLET ORAL DAILY
Qty: 90 TABLET | Refills: 1 | Status: SHIPPED | OUTPATIENT
Start: 2024-04-24

## 2024-06-05 ENCOUNTER — OFFICE VISIT (OUTPATIENT)
Dept: INTERNAL MEDICINE CLINIC | Age: 64
End: 2024-06-05
Payer: COMMERCIAL

## 2024-06-05 VITALS
OXYGEN SATURATION: 98 % | BODY MASS INDEX: 54.49 KG/M2 | WEIGHT: 293 LBS | SYSTOLIC BLOOD PRESSURE: 128 MMHG | RESPIRATION RATE: 16 BRPM | HEART RATE: 83 BPM | DIASTOLIC BLOOD PRESSURE: 82 MMHG

## 2024-06-05 DIAGNOSIS — I88.9 CERVICAL LYMPHADENITIS: ICD-10-CM

## 2024-06-05 DIAGNOSIS — H66.90 ACUTE OTITIS MEDIA, UNSPECIFIED OTITIS MEDIA TYPE: Primary | ICD-10-CM

## 2024-06-05 PROCEDURE — 3074F SYST BP LT 130 MM HG: CPT | Performed by: FAMILY MEDICINE

## 2024-06-05 PROCEDURE — 99213 OFFICE O/P EST LOW 20 MIN: CPT | Performed by: FAMILY MEDICINE

## 2024-06-05 PROCEDURE — 3079F DIAST BP 80-89 MM HG: CPT | Performed by: FAMILY MEDICINE

## 2024-06-05 RX ORDER — AMOXICILLIN AND CLAVULANATE POTASSIUM 875; 125 MG/1; MG/1
1 TABLET, FILM COATED ORAL 2 TIMES DAILY
Qty: 14 TABLET | Refills: 0 | Status: SHIPPED | OUTPATIENT
Start: 2024-06-05 | End: 2024-06-12

## 2024-06-05 ASSESSMENT — ENCOUNTER SYMPTOMS
SHORTNESS OF BREATH: 0
ABDOMINAL PAIN: 0
NAUSEA: 0
COUGH: 0

## 2024-06-05 NOTE — PROGRESS NOTES
Reji Brown (:  1960) is a 64 y.o. female,established patient, here for evaluation of the following chief complaint(s):  Otalgia (Right ear radiating to head and jaw x 2 weeks ) and Headache      Assessment & Plan   ASSESSMENT/PLAN:  1. Acute otitis media, unspecified otitis media type  Start:  - amoxicillin-clavulanate (AUGMENTIN) 875-125 MG per tablet; Take 1 tablet by mouth 2 times daily for 7 days  Dispense: 14 tablet; Refill: 0  ADR's explained    2. Cervical lymphadenitis  - amoxicillin-clavulanate (AUGMENTIN) 875-125 MG per tablet; Take 1 tablet by mouth 2 times daily for 7 days  Dispense: 14 tablet; Refill: 0    Medications reconciled and discussed with the patient  Return if symptoms worsen or fail to improve.       Lab Results   Component Value Date    WBC 6.1 2023    HGB 16.4 (H) 2023    HCT 48.4 (H) 2023    MCV 88 2023     2023       Lab Results   Component Value Date    LABA1C 8.9 2024     Lab Results   Component Value Date     01/10/2013     Lab Results   Component Value Date     2023    K 4.3 2023    CL 99 2023    CO2 20 2023    BUN 17 2023    CREATININE 0.74 2023    GLUCOSE 152 (H) 2023    CALCIUM 10.1 2023    PROT 7.5 2019    BILITOT 0.8 2023    ALKPHOS 76 2023    AST 24 2023    ALT 30 2023    LABGLOM >60 2023    GFRAA >60 2022    AGRATIO 1.7 2022    GLOB 2.8 2022     Lab Results   Component Value Date    TSH 1.900 2023     Lab Results   Component Value Date/Time    COLORU LT.YELLOW 2013 11:43 AM    NITRU NEGATIVE 2013 11:43 AM    GLUCOSEU NEGATIVE 2013 11:43 AM    KETUA NEGATIVE 2013 11:43 AM    UROBILINOGEN 0.2 2013 11:43 AM    BILIRUBINUR NEGATIVE 2013 11:43 AM       Subjective   SUBJECTIVE/OBJECTIVE:    HISTORY OF PRESENT ILLNESS:  This is a 64 y.o. female here for the

## 2024-08-02 LAB
BASOPHILS ABSOLUTE: 0.1 /ΜL
BASOPHILS RELATIVE PERCENT: 1 %
EOSINOPHILS ABSOLUTE: 0.1 /ΜL
EOSINOPHILS RELATIVE PERCENT: 2 %
ESTIMATED AVERAGE GLUCOSE: NORMAL
HBA1C MFR BLD: 9.1 %
HCT VFR BLD CALC: 51.7 % (ref 36–46)
HEMOGLOBIN: 17.1 G/DL (ref 12–16)
LYMPHOCYTES ABSOLUTE: 1.8 /ΜL
LYMPHOCYTES RELATIVE PERCENT: 26 %
MCH RBC QN AUTO: 30.5 PG
MCHC RBC AUTO-ENTMCNC: 33.1 G/DL
MCV RBC AUTO: 92 FL
MONOCYTES ABSOLUTE: 0.4 /ΜL
MONOCYTES RELATIVE PERCENT: 6 %
NEUTROPHILS ABSOLUTE: 4.5 /ΜL
NEUTROPHILS RELATIVE PERCENT: 64 %
PDW BLD-RTO: 13.5 %
PLATELET # BLD: 201 K/ΜL
PMV BLD AUTO: ABNORMAL FL
RBC # BLD: 5.6 10^6/ΜL
WBC # BLD: 6.9 10^3/ML

## 2024-08-05 SDOH — ECONOMIC STABILITY: FOOD INSECURITY: WITHIN THE PAST 12 MONTHS, THE FOOD YOU BOUGHT JUST DIDN'T LAST AND YOU DIDN'T HAVE MONEY TO GET MORE.: NEVER TRUE

## 2024-08-05 SDOH — ECONOMIC STABILITY: INCOME INSECURITY: HOW HARD IS IT FOR YOU TO PAY FOR THE VERY BASICS LIKE FOOD, HOUSING, MEDICAL CARE, AND HEATING?: NOT VERY HARD

## 2024-08-05 SDOH — ECONOMIC STABILITY: TRANSPORTATION INSECURITY
IN THE PAST 12 MONTHS, HAS LACK OF TRANSPORTATION KEPT YOU FROM MEETINGS, WORK, OR FROM GETTING THINGS NEEDED FOR DAILY LIVING?: YES

## 2024-08-05 SDOH — ECONOMIC STABILITY: FOOD INSECURITY: WITHIN THE PAST 12 MONTHS, YOU WORRIED THAT YOUR FOOD WOULD RUN OUT BEFORE YOU GOT MONEY TO BUY MORE.: NEVER TRUE

## 2024-08-07 ENCOUNTER — OFFICE VISIT (OUTPATIENT)
Dept: INTERNAL MEDICINE CLINIC | Age: 64
End: 2024-08-07
Payer: COMMERCIAL

## 2024-08-07 ENCOUNTER — TELEPHONE (OUTPATIENT)
Dept: INTERNAL MEDICINE CLINIC | Age: 64
End: 2024-08-07

## 2024-08-07 VITALS
HEART RATE: 100 BPM | HEIGHT: 63 IN | BODY MASS INDEX: 51.91 KG/M2 | DIASTOLIC BLOOD PRESSURE: 80 MMHG | SYSTOLIC BLOOD PRESSURE: 124 MMHG | OXYGEN SATURATION: 94 % | WEIGHT: 293 LBS

## 2024-08-07 DIAGNOSIS — I10 ESSENTIAL HYPERTENSION: ICD-10-CM

## 2024-08-07 DIAGNOSIS — D75.1 ERYTHROCYTOSIS: ICD-10-CM

## 2024-08-07 DIAGNOSIS — K74.60 HEPATIC CIRRHOSIS, UNSPECIFIED HEPATIC CIRRHOSIS TYPE, UNSPECIFIED WHETHER ASCITES PRESENT (HCC): ICD-10-CM

## 2024-08-07 DIAGNOSIS — E03.9 HYPOTHYROIDISM, UNSPECIFIED TYPE: ICD-10-CM

## 2024-08-07 DIAGNOSIS — E78.5 HYPERLIPIDEMIA, UNSPECIFIED HYPERLIPIDEMIA TYPE: ICD-10-CM

## 2024-08-07 DIAGNOSIS — E11.65 TYPE 2 DIABETES MELLITUS WITH HYPERGLYCEMIA, WITHOUT LONG-TERM CURRENT USE OF INSULIN (HCC): ICD-10-CM

## 2024-08-07 DIAGNOSIS — E11.65 TYPE 2 DIABETES MELLITUS WITH HYPERGLYCEMIA, WITHOUT LONG-TERM CURRENT USE OF INSULIN (HCC): Primary | ICD-10-CM

## 2024-08-07 DIAGNOSIS — R42 VERTIGO: ICD-10-CM

## 2024-08-07 PROCEDURE — 99214 OFFICE O/P EST MOD 30 MIN: CPT | Performed by: FAMILY MEDICINE

## 2024-08-07 PROCEDURE — 3079F DIAST BP 80-89 MM HG: CPT | Performed by: FAMILY MEDICINE

## 2024-08-07 PROCEDURE — 3074F SYST BP LT 130 MM HG: CPT | Performed by: FAMILY MEDICINE

## 2024-08-07 PROCEDURE — 3046F HEMOGLOBIN A1C LEVEL >9.0%: CPT | Performed by: FAMILY MEDICINE

## 2024-08-07 RX ORDER — MECLIZINE HYDROCHLORIDE 25 MG/1
25 TABLET ORAL 3 TIMES DAILY PRN
Qty: 30 TABLET | Refills: 0 | Status: SHIPPED | OUTPATIENT
Start: 2024-08-07

## 2024-08-07 NOTE — PATIENT INSTRUCTIONS
[facility] Transportation Resources*  (Call [employer]/211 if need more resources.)       [place] [employer] [facility] ([employer]):  What they offer: Transportation for the entire general public of [facility] for $2 one way anywhere within the city/ town. 65+ and persons with disabilities may ride for half fare in [facility], must call in advance to schedule.  Phone Number: [phone]  [facility] ([place]):   What they offer: fixed-route transit system with both ADA Paratransit and Dial-A-Ride transportation options.   Phone Number: [phone]   [facility]:   What they offer: Pyht-wt-qnpw transportation is provided to medical appointments, nutritional sites, and essential errands for [facility] residents, age 60+. There is no fee, but donations are appreciated.  Phone Number: [phone]  [facility]:  What they offer: non-emergency Medical transportation services; Are limited to persons approved for Medicaid and to appointments for a Medicaid-covered service.  Phone number: [phone]. For established Clients setting up transportation, please call [phone]  [facility]:  What they offer: Medicaid non-emergency transportation program is for Medicaid-eligible persons to cover their costs to and from Medicaid-covered service appointments  Phone number: [phone]    [place] Medicaid Transportation Assistance Hotline: 214.357.4754      The Office of Student Support ([employer])  Office of Student Support:   Contact: [phone]

## 2024-08-07 NOTE — PROGRESS NOTES
LABA1C 9.1 08/02/2024     Lab Results   Component Value Date     01/10/2013     Lab Results   Component Value Date     12/07/2023    K 4.3 12/07/2023    CL 99 12/07/2023    CO2 20 12/07/2023    BUN 17 12/07/2023    CREATININE 0.74 12/07/2023    GLUCOSE 152 (H) 12/07/2023    CALCIUM 10.1 12/07/2023    BILITOT 0.8 07/12/2023    ALKPHOS 76 07/12/2023    AST 24 07/12/2023    ALT 30 07/12/2023    LABGLOM >60 02/27/2023    GFRAA >60 04/04/2022    AGRATIO 1.7 06/09/2022    GLOB 2.8 06/09/2022     Lab Results   Component Value Date    TSH 1.900 12/07/2023     Lab Results   Component Value Date/Time    COLORU LT.YELLOW 12/29/2013 11:43 AM    NITRU NEGATIVE 12/29/2013 11:43 AM    GLUCOSEU NEGATIVE 12/29/2013 11:43 AM    KETUA NEGATIVE 12/29/2013 11:43 AM    UROBILINOGEN 0.2 12/29/2013 11:43 AM    BILIRUBINUR NEGATIVE 12/29/2013 11:43 AM       Subjective   SUBJECTIVE/OBJECTIVE:    HISTORY OF PRESENT ILLNESS:  This is a 64 y.o. female here for the following:  Patient Active Problem List    Diagnosis Date Noted    Hepatic cirrhosis, unspecified hepatic cirrhosis type, unspecified whether ascites present (HCC) 08/07/2024    Type 2 diabetes mellitus 07/18/2023    Chronic rhinitis     Fullness of supraclavicular fossa 08/19/2020    Essential hypertension 06/09/2020    Hypothyroidism 06/09/2020    Hyperlipidemia 06/09/2020      DM II- not controlled. She was not able to obtain the Rybelsus, but recently restarted. On metformin, Jardiance and  gliburide  Hypothyroidism- on levothyroxine 175 mcg  HLD- on pravastatin 40  HTN- on  losartan, Norvasc and HCTZ  Cirrhosis- she has not had a recent evaluation with GI  Vertigo-- seen by ENT. She can still have symptoms with change in head positions off and on.   Erythrocytosis- recurrent. Previously seen by hematology    Review of Systems   Constitutional:  Negative for diaphoresis and fever.   Respiratory:  Negative for cough and shortness of breath.    Cardiovascular:

## 2024-08-27 ENCOUNTER — HOSPITAL ENCOUNTER (OUTPATIENT)
Dept: ULTRASOUND IMAGING | Age: 64
Discharge: HOME | End: 2024-08-27
Payer: COMMERCIAL

## 2024-08-27 DIAGNOSIS — K74.60 HEPATIC CIRRHOSIS, UNSPECIFIED HEPATIC CIRRHOSIS TYPE, UNSPECIFIED WHETHER ASCITES PRESENT (HCC): ICD-10-CM

## 2024-08-27 PROCEDURE — 76705 ECHO EXAM OF ABDOMEN: CPT

## 2024-08-28 DIAGNOSIS — J31.0 CHRONIC RHINITIS: ICD-10-CM

## 2024-08-28 DIAGNOSIS — N95.1 MENOPAUSAL SYNDROME: ICD-10-CM

## 2024-08-28 DIAGNOSIS — E11.65 TYPE 2 DIABETES MELLITUS WITH HYPERGLYCEMIA, WITHOUT LONG-TERM CURRENT USE OF INSULIN (HCC): ICD-10-CM

## 2024-08-28 RX ORDER — MONTELUKAST SODIUM 10 MG/1
10 TABLET ORAL DAILY
Qty: 90 TABLET | Refills: 0 | Status: SHIPPED | OUTPATIENT
Start: 2024-08-28

## 2024-08-28 RX ORDER — PAROXETINE 10 MG/1
10 TABLET, FILM COATED ORAL EVERY MORNING
Qty: 90 TABLET | Refills: 0 | Status: SHIPPED | OUTPATIENT
Start: 2024-08-28

## 2024-08-28 RX ORDER — EMPAGLIFLOZIN 25 MG/1
25 TABLET, FILM COATED ORAL DAILY
Qty: 90 TABLET | Refills: 0 | Status: SHIPPED | OUTPATIENT
Start: 2024-08-28

## 2024-09-13 ENCOUNTER — HOSPITAL ENCOUNTER (OUTPATIENT)
Dept: WOMENS IMAGING | Age: 64
Discharge: HOME | End: 2024-09-13
Payer: COMMERCIAL

## 2024-09-13 VITALS — HEIGHT: 63 IN | BODY MASS INDEX: 51.91 KG/M2 | WEIGHT: 293 LBS

## 2024-09-13 DIAGNOSIS — Z12.31 ENCOUNTER FOR SCREENING MAMMOGRAM FOR BREAST CANCER: ICD-10-CM

## 2024-09-13 PROCEDURE — 77063 BREAST TOMOSYNTHESIS BI: CPT

## 2024-09-17 DIAGNOSIS — E11.65 TYPE 2 DIABETES MELLITUS WITH HYPERGLYCEMIA, WITHOUT LONG-TERM CURRENT USE OF INSULIN (HCC): ICD-10-CM

## 2024-09-17 RX ORDER — GLYBURIDE 5 MG/1
TABLET ORAL
Qty: 270 TABLET | Refills: 0 | Status: SHIPPED | OUTPATIENT
Start: 2024-09-17

## 2024-10-02 DIAGNOSIS — I10 ESSENTIAL HYPERTENSION: ICD-10-CM

## 2024-10-02 RX ORDER — LOSARTAN POTASSIUM 100 MG/1
100 TABLET ORAL DAILY
Qty: 90 TABLET | Refills: 0 | Status: SHIPPED | OUTPATIENT
Start: 2024-10-02

## 2024-10-07 DIAGNOSIS — E03.9 HYPOTHYROIDISM, UNSPECIFIED TYPE: ICD-10-CM

## 2024-10-07 RX ORDER — LEVOTHYROXINE SODIUM 175 UG/1
175 TABLET ORAL DAILY
Qty: 90 TABLET | Refills: 0 | Status: SHIPPED | OUTPATIENT
Start: 2024-10-07

## 2024-10-22 DIAGNOSIS — I10 ESSENTIAL HYPERTENSION: ICD-10-CM

## 2024-10-22 RX ORDER — AMLODIPINE BESYLATE 5 MG/1
5 TABLET ORAL DAILY
Qty: 90 TABLET | Refills: 1 | Status: SHIPPED | OUTPATIENT
Start: 2024-10-22

## 2024-10-23 DIAGNOSIS — E78.5 HYPERLIPIDEMIA, UNSPECIFIED HYPERLIPIDEMIA TYPE: ICD-10-CM

## 2024-10-23 RX ORDER — PRAVASTATIN SODIUM 40 MG
40 TABLET ORAL DAILY
Qty: 90 TABLET | Refills: 0 | Status: SHIPPED | OUTPATIENT
Start: 2024-10-23

## 2024-11-05 LAB
ALBUMIN: 4.3 G/DL
ALP BLD-CCNC: 68 U/L
ALT SERPL-CCNC: 28 U/L
ANION GAP SERPL CALCULATED.3IONS-SCNC: NORMAL MMOL/L
AST SERPL-CCNC: 27 U/L
BASOPHILS ABSOLUTE: 0.1 /ΜL
BASOPHILS RELATIVE PERCENT: 1 %
BILIRUB SERPL-MCNC: 0.8 MG/DL (ref 0.1–1.4)
BUN BLDV-MCNC: 13 MG/DL
CALCIUM SERPL-MCNC: 9.6 MG/DL
CHLORIDE BLD-SCNC: 102 MMOL/L
CO2: 25 MMOL/L
CREAT SERPL-MCNC: 0.73 MG/DL
EOSINOPHILS ABSOLUTE: 0.2 /ΜL
EOSINOPHILS RELATIVE PERCENT: 3 %
ESTIMATED AVERAGE GLUCOSE: NORMAL
GFR, ESTIMATED: 92
GLUCOSE BLD-MCNC: 125 MG/DL
HBA1C MFR BLD: 8.3 %
HCT VFR BLD CALC: 46.6 % (ref 36–46)
HEMOGLOBIN: 15.6 G/DL (ref 12–16)
LYMPHOCYTES ABSOLUTE: 2.1 /ΜL
LYMPHOCYTES RELATIVE PERCENT: 31 %
MCH RBC QN AUTO: 30.4 PG
MCHC RBC AUTO-ENTMCNC: 33.5 G/DL
MCV RBC AUTO: 91 FL
MONOCYTES ABSOLUTE: 0.5 /ΜL
MONOCYTES RELATIVE PERCENT: 7 %
NEUTROPHILS ABSOLUTE: 3.8 /ΜL
NEUTROPHILS RELATIVE PERCENT: 57 %
PLATELET # BLD: 226 K/ΜL
PMV BLD AUTO: ABNORMAL FL
POTASSIUM SERPL-SCNC: 4 MMOL/L
RBC # BLD: 5.13 10^6/ΜL
SODIUM BLD-SCNC: 143 MMOL/L
TOTAL PROTEIN: 7.1 G/DL (ref 6.4–8.2)
WBC # BLD: 6.6 10^3/ML

## 2024-11-07 ENCOUNTER — OFFICE VISIT (OUTPATIENT)
Dept: INTERNAL MEDICINE CLINIC | Age: 64
End: 2024-11-07

## 2024-11-07 VITALS
SYSTOLIC BLOOD PRESSURE: 128 MMHG | OXYGEN SATURATION: 98 % | WEIGHT: 293 LBS | HEART RATE: 100 BPM | BODY MASS INDEX: 53.67 KG/M2 | DIASTOLIC BLOOD PRESSURE: 60 MMHG

## 2024-11-07 DIAGNOSIS — K74.60 CIRRHOSIS OF LIVER WITHOUT ASCITES, UNSPECIFIED HEPATIC CIRRHOSIS TYPE (HCC): ICD-10-CM

## 2024-11-07 DIAGNOSIS — E03.9 HYPOTHYROIDISM, UNSPECIFIED TYPE: ICD-10-CM

## 2024-11-07 DIAGNOSIS — E78.5 HYPERLIPIDEMIA, UNSPECIFIED HYPERLIPIDEMIA TYPE: ICD-10-CM

## 2024-11-07 DIAGNOSIS — E11.65 TYPE 2 DIABETES MELLITUS WITH HYPERGLYCEMIA, WITHOUT LONG-TERM CURRENT USE OF INSULIN (HCC): Primary | ICD-10-CM

## 2024-11-07 DIAGNOSIS — I10 ESSENTIAL HYPERTENSION: ICD-10-CM

## 2024-11-07 ASSESSMENT — ENCOUNTER SYMPTOMS
ABDOMINAL PAIN: 0
SHORTNESS OF BREATH: 0
NAUSEA: 0
COUGH: 0

## 2024-11-07 NOTE — PROGRESS NOTES
Reji Brown (:  1960) is a 64 y.o. female,established patient, here for evaluation of the following chief complaint(s):  3 Month Follow-Up and Diabetes      Assessment & Plan   ASSESSMENT/PLAN:    Assessment & Plan  1. Type 2 diabetes mellitus with hyperglycemia without long term current use of insulin(HCC)  Stable  She will continue  Rybelsus, metformin, Jardiance, and glyburide as well as her basal insulin.    2. Essential hypertension.  Stable  She will continue  hydrochlorothiazide, amlodipine, and losartan.    3. Hyperlipidemia, unspecified hyperlipidemia type  She will continue her pravastatin 40 mg tablet.    4. Hypothyroidism, unspecified type  She will continue her levothyroxine 175 mcg.    5. Cirrhosis of the liver without ascites, unspecified hepatic cirrhosis type (HCC)  She declines further specialist evaluation. Her condition will be monitored.      Orders Placed This Encounter   Procedures    Hemoglobin A1C    T4, Free    TSH    Microalbumin / Creatinine Urine Ratio    Lipid Panel       Medications reconciled and discussed with the patient  The patient is asked to make an attempt to improve diet and exercise patterns   Return for follow up 5 to 6 months for DM, HTN.       Lab Results   Component Value Date    WBC 6.6 2024    HGB 15.6 2024    HCT 46.6 (A) 2024    MCV 91 2024     2024     Lab Results   Component Value Date    CHOL 166 2023     Lab Results   Component Value Date    TRIG 187 (H) 2023     Lab Results   Component Value Date    HDL 48 2023     Lab Results   Component Value Date    LDL 86 2023    LDLDIRECT 126 (H) 2011       Lab Results   Component Value Date    LABA1C 8.3 2024     Lab Results   Component Value Date     01/10/2013     Lab Results   Component Value Date     2024    K 4.0 2024     2024    CO2 25 2024    BUN 13 2024    CREATININE 0.73 2024

## 2024-11-13 DIAGNOSIS — E11.65 TYPE 2 DIABETES MELLITUS WITH HYPERGLYCEMIA, WITHOUT LONG-TERM CURRENT USE OF INSULIN (HCC): ICD-10-CM

## 2024-11-13 RX ORDER — INSULIN GLARGINE 100 [IU]/ML
INJECTION, SOLUTION SUBCUTANEOUS
Qty: 15 ADJUSTABLE DOSE PRE-FILLED PEN SYRINGE | Refills: 4 | Status: SHIPPED | OUTPATIENT
Start: 2024-11-13 | End: 2024-11-14 | Stop reason: SDUPTHER

## 2024-11-13 RX ORDER — INSULIN GLARGINE-YFGN 100 [IU]/ML
INJECTION, SOLUTION SUBCUTANEOUS
Qty: 15 ML | Refills: 4 | Status: CANCELLED | OUTPATIENT
Start: 2024-11-13

## 2024-11-14 RX ORDER — INSULIN GLARGINE 100 [IU]/ML
INJECTION, SOLUTION SUBCUTANEOUS
Qty: 15 ADJUSTABLE DOSE PRE-FILLED PEN SYRINGE | Refills: 4 | Status: SHIPPED | OUTPATIENT
Start: 2024-11-14

## 2024-11-14 NOTE — TELEPHONE ENCOUNTER
Patient called stating her insulin went to [facility] instead of [facility] at the hospital.

## 2024-11-20 DIAGNOSIS — E03.9 HYPOTHYROIDISM, UNSPECIFIED TYPE: ICD-10-CM

## 2024-11-20 DIAGNOSIS — E11.65 TYPE 2 DIABETES MELLITUS WITH HYPERGLYCEMIA, WITHOUT LONG-TERM CURRENT USE OF INSULIN (HCC): ICD-10-CM

## 2024-11-20 DIAGNOSIS — E78.5 HYPERLIPIDEMIA, UNSPECIFIED HYPERLIPIDEMIA TYPE: ICD-10-CM

## 2024-11-20 RX ORDER — LEVOTHYROXINE SODIUM 175 UG/1
175 TABLET ORAL DAILY
Qty: 90 TABLET | Refills: 0 | Status: SHIPPED | OUTPATIENT
Start: 2024-11-20

## 2024-11-20 RX ORDER — GLYBURIDE 5 MG/1
TABLET ORAL
Qty: 270 TABLET | Refills: 0 | Status: SHIPPED | OUTPATIENT
Start: 2024-11-20

## 2024-11-20 RX ORDER — PRAVASTATIN SODIUM 40 MG
40 TABLET ORAL DAILY
Qty: 90 TABLET | Refills: 0 | Status: SHIPPED | OUTPATIENT
Start: 2024-11-20

## 2024-11-21 DIAGNOSIS — E11.65 TYPE 2 DIABETES MELLITUS WITH HYPERGLYCEMIA, WITHOUT LONG-TERM CURRENT USE OF INSULIN (HCC): ICD-10-CM

## 2024-11-21 RX ORDER — GLYBURIDE 5 MG/1
TABLET ORAL
Qty: 270 TABLET | Refills: 0 | OUTPATIENT
Start: 2024-11-21

## 2024-11-23 DIAGNOSIS — I10 ESSENTIAL HYPERTENSION: ICD-10-CM

## 2024-11-23 DIAGNOSIS — N95.1 MENOPAUSAL SYNDROME: ICD-10-CM

## 2024-11-23 DIAGNOSIS — J31.0 CHRONIC RHINITIS: ICD-10-CM

## 2024-11-23 DIAGNOSIS — E11.65 TYPE 2 DIABETES MELLITUS WITH HYPERGLYCEMIA, WITHOUT LONG-TERM CURRENT USE OF INSULIN (HCC): ICD-10-CM

## 2024-11-25 RX ORDER — MONTELUKAST SODIUM 10 MG/1
10 TABLET ORAL DAILY
Qty: 90 TABLET | Refills: 0 | OUTPATIENT
Start: 2024-11-25

## 2024-11-25 RX ORDER — HYDROCHLOROTHIAZIDE 25 MG/1
50 TABLET ORAL DAILY
Qty: 180 TABLET | Refills: 0 | OUTPATIENT
Start: 2024-11-25

## 2024-11-25 RX ORDER — PAROXETINE 10 MG/1
10 TABLET, FILM COATED ORAL EVERY MORNING
Qty: 90 TABLET | Refills: 0 | OUTPATIENT
Start: 2024-11-25

## 2024-11-25 RX ORDER — EMPAGLIFLOZIN 25 MG/1
25 TABLET, FILM COATED ORAL DAILY
Qty: 90 TABLET | Refills: 0 | OUTPATIENT
Start: 2024-11-25

## 2024-12-01 DIAGNOSIS — E11.65 TYPE 2 DIABETES MELLITUS WITH HYPERGLYCEMIA, WITHOUT LONG-TERM CURRENT USE OF INSULIN (HCC): ICD-10-CM

## 2024-12-01 DIAGNOSIS — N95.1 MENOPAUSAL SYNDROME: ICD-10-CM

## 2024-12-02 ENCOUNTER — PATIENT MESSAGE (OUTPATIENT)
Dept: INTERNAL MEDICINE CLINIC | Age: 64
End: 2024-12-02

## 2024-12-02 DIAGNOSIS — E11.65 TYPE 2 DIABETES MELLITUS WITH HYPERGLYCEMIA, WITHOUT LONG-TERM CURRENT USE OF INSULIN (HCC): ICD-10-CM

## 2024-12-02 RX ORDER — PAROXETINE 10 MG/1
10 TABLET, FILM COATED ORAL EVERY MORNING
Qty: 90 TABLET | Refills: 1 | Status: SHIPPED | OUTPATIENT
Start: 2024-12-02

## 2024-12-02 RX ORDER — ORAL SEMAGLUTIDE 14 MG/1
1 TABLET ORAL DAILY
Qty: 90 TABLET | Refills: 1 | OUTPATIENT
Start: 2024-12-02

## 2024-12-16 DIAGNOSIS — E11.65 TYPE 2 DIABETES MELLITUS WITH HYPERGLYCEMIA, WITHOUT LONG-TERM CURRENT USE OF INSULIN (HCC): ICD-10-CM

## 2024-12-20 ENCOUNTER — OFFICE VISIT (OUTPATIENT)
Dept: INTERNAL MEDICINE CLINIC | Age: 64
End: 2024-12-20

## 2024-12-20 VITALS
SYSTOLIC BLOOD PRESSURE: 128 MMHG | HEART RATE: 91 BPM | OXYGEN SATURATION: 97 % | DIASTOLIC BLOOD PRESSURE: 78 MMHG | WEIGHT: 293 LBS | BODY MASS INDEX: 53.14 KG/M2 | TEMPERATURE: 98.2 F

## 2024-12-20 DIAGNOSIS — J32.9 SINOBRONCHITIS: Primary | ICD-10-CM

## 2024-12-20 DIAGNOSIS — J40 SINOBRONCHITIS: Primary | ICD-10-CM

## 2024-12-20 RX ORDER — DOXYCYCLINE HYCLATE 100 MG
100 TABLET ORAL 2 TIMES DAILY
Qty: 14 TABLET | Refills: 0 | Status: SHIPPED | OUTPATIENT
Start: 2024-12-20 | End: 2024-12-27

## 2024-12-20 ASSESSMENT — ENCOUNTER SYMPTOMS
COUGH: 1
NAUSEA: 0
SHORTNESS OF BREATH: 0
RHINORRHEA: 1
SORE THROAT: 1
SINUS PRESSURE: 1
ABDOMINAL PAIN: 0

## 2024-12-20 NOTE — PROGRESS NOTES
3 times daily as needed for Dizziness 30 tablet 0    Semaglutide (RYBELSUS) 14 MG TABS Take 1 tablet by mouth daily 90 tablet 1    hydroCHLOROthiazide (HYDRODIURIL) 25 MG tablet Take 2 tablets by mouth daily 180 tablet 1    Insulin Glargine-yfgn 100 UNIT/ML SOPN Inject 10 units in to the skin and titrate as directed every evening. Max 50 units 15 mL 4    aspirin 81 MG EC tablet Take 1 tablet by mouth nightly      azelastine (ASTELIN) 0.1 % nasal spray 1 spray by Nasal route 2 times daily      fluticasone (FLONASE) 50 MCG/ACT nasal spray 1 spray by Nasal route 2 times daily       No current facility-administered medications for this visit.            Vitals:    12/20/24 0952   BP: 128/78   Site: Left Wrist   Position: Sitting   Cuff Size: Medium Adult   Pulse: 91   Temp: 98.2 °F (36.8 °C)   SpO2: 97%   Weight: 136.1 kg (300 lb)     Objective   Physical Exam  Vitals reviewed.   Constitutional:       General: She is not in acute distress.  HENT:      Right Ear: Tympanic membrane normal.      Left Ear: Tympanic membrane normal.      Nose: Congestion and rhinorrhea present.      Mouth/Throat:      Mouth: Mucous membranes are moist.   Eyes:      Extraocular Movements: Extraocular movements intact.      Pupils: Pupils are equal, round, and reactive to light.   Cardiovascular:      Rate and Rhythm: Normal rate and regular rhythm.   Pulmonary:      Effort: Pulmonary effort is normal. No respiratory distress.      Breath sounds: Normal breath sounds.   Abdominal:      Palpations: Abdomen is soft.      Tenderness: There is no abdominal tenderness.   Musculoskeletal:      Cervical back: Neck supple.   Neurological:      Mental Status: She is alert and oriented to person, place, and time.                The patient (or guardian, if applicable) and other individuals in attendance with the patient were advised that Artificial Intelligence will be utilized during this visit to record, process the conversation to generate a clinical

## 2024-12-22 DIAGNOSIS — I10 ESSENTIAL HYPERTENSION: ICD-10-CM

## 2024-12-23 ENCOUNTER — PATIENT MESSAGE (OUTPATIENT)
Dept: INTERNAL MEDICINE CLINIC | Age: 64
End: 2024-12-23

## 2024-12-23 DIAGNOSIS — E11.65 TYPE 2 DIABETES MELLITUS WITH HYPERGLYCEMIA, WITHOUT LONG-TERM CURRENT USE OF INSULIN (HCC): ICD-10-CM

## 2024-12-23 RX ORDER — LOSARTAN POTASSIUM 100 MG/1
100 TABLET ORAL DAILY
Qty: 90 TABLET | Refills: 1 | Status: SHIPPED | OUTPATIENT
Start: 2024-12-23

## 2025-01-07 DIAGNOSIS — J31.0 CHRONIC RHINITIS: ICD-10-CM

## 2025-01-07 DIAGNOSIS — I10 ESSENTIAL HYPERTENSION: ICD-10-CM

## 2025-01-07 RX ORDER — MONTELUKAST SODIUM 10 MG/1
10 TABLET ORAL DAILY
Qty: 90 TABLET | Refills: 1 | Status: SHIPPED | OUTPATIENT
Start: 2025-01-07

## 2025-01-07 RX ORDER — HYDROCHLOROTHIAZIDE 25 MG/1
50 TABLET ORAL DAILY
Qty: 180 TABLET | Refills: 1 | Status: SHIPPED | OUTPATIENT
Start: 2025-01-07

## 2025-01-30 DIAGNOSIS — E11.65 TYPE 2 DIABETES MELLITUS WITH HYPERGLYCEMIA, WITHOUT LONG-TERM CURRENT USE OF INSULIN (HCC): ICD-10-CM

## 2025-02-03 DIAGNOSIS — E11.65 TYPE 2 DIABETES MELLITUS WITH HYPERGLYCEMIA, WITHOUT LONG-TERM CURRENT USE OF INSULIN (HCC): ICD-10-CM

## 2025-02-03 DIAGNOSIS — E78.5 HYPERLIPIDEMIA, UNSPECIFIED HYPERLIPIDEMIA TYPE: ICD-10-CM

## 2025-02-03 DIAGNOSIS — E03.9 HYPOTHYROIDISM, UNSPECIFIED TYPE: ICD-10-CM

## 2025-02-03 RX ORDER — PRAVASTATIN SODIUM 40 MG
40 TABLET ORAL DAILY
Qty: 90 TABLET | Refills: 0 | OUTPATIENT
Start: 2025-02-03

## 2025-02-03 RX ORDER — GLYBURIDE 5 MG/1
TABLET ORAL
Qty: 270 TABLET | Refills: 0 | OUTPATIENT
Start: 2025-02-03

## 2025-02-03 RX ORDER — LEVOTHYROXINE SODIUM 175 UG/1
175 TABLET ORAL DAILY
Qty: 90 TABLET | Refills: 0 | OUTPATIENT
Start: 2025-02-03

## 2025-02-11 DIAGNOSIS — E11.65 TYPE 2 DIABETES MELLITUS WITH HYPERGLYCEMIA, WITHOUT LONG-TERM CURRENT USE OF INSULIN (HCC): ICD-10-CM

## 2025-02-11 DIAGNOSIS — J31.0 CHRONIC RHINITIS: ICD-10-CM

## 2025-02-11 DIAGNOSIS — I10 ESSENTIAL HYPERTENSION: ICD-10-CM

## 2025-02-11 RX ORDER — HYDROCHLOROTHIAZIDE 25 MG/1
50 TABLET ORAL DAILY
Qty: 180 TABLET | Refills: 0 | Status: SHIPPED | OUTPATIENT
Start: 2025-02-11

## 2025-02-11 RX ORDER — MONTELUKAST SODIUM 10 MG/1
10 TABLET ORAL DAILY
Qty: 90 TABLET | Refills: 0 | Status: SHIPPED | OUTPATIENT
Start: 2025-02-11

## 2025-02-11 RX ORDER — GLYBURIDE 5 MG/1
TABLET ORAL
Qty: 270 TABLET | Refills: 0 | Status: SHIPPED | OUTPATIENT
Start: 2025-02-11

## 2025-02-14 DIAGNOSIS — E11.65 TYPE 2 DIABETES MELLITUS WITH HYPERGLYCEMIA, WITHOUT LONG-TERM CURRENT USE OF INSULIN (HCC): ICD-10-CM

## 2025-02-14 RX ORDER — ORAL SEMAGLUTIDE 14 MG/1
1 TABLET ORAL DAILY
Qty: 90 TABLET | Refills: 1 | Status: CANCELLED | OUTPATIENT
Start: 2025-02-14

## 2025-02-14 NOTE — TELEPHONE ENCOUNTER
She will obtain Rybelsus from the pharmaceutical company. She states there is a reorder form that should be sent to us. I do not have. The patient will check on this issue

## 2025-02-17 DIAGNOSIS — E11.65 TYPE 2 DIABETES MELLITUS WITH HYPERGLYCEMIA, WITHOUT LONG-TERM CURRENT USE OF INSULIN (HCC): ICD-10-CM

## 2025-02-17 RX ORDER — GLYBURIDE 5 MG/1
TABLET ORAL
Qty: 270 TABLET | Refills: 0 | Status: SHIPPED | OUTPATIENT
Start: 2025-02-17

## 2025-02-18 DIAGNOSIS — J31.0 CHRONIC RHINITIS: ICD-10-CM

## 2025-02-18 DIAGNOSIS — E78.5 HYPERLIPIDEMIA, UNSPECIFIED HYPERLIPIDEMIA TYPE: ICD-10-CM

## 2025-02-18 DIAGNOSIS — I10 ESSENTIAL HYPERTENSION: ICD-10-CM

## 2025-02-18 RX ORDER — MONTELUKAST SODIUM 10 MG/1
10 TABLET ORAL DAILY
Qty: 30 TABLET | OUTPATIENT
Start: 2025-02-18

## 2025-02-18 RX ORDER — HYDROCHLOROTHIAZIDE 25 MG/1
50 TABLET ORAL DAILY
Qty: 60 TABLET | OUTPATIENT
Start: 2025-02-18

## 2025-02-18 RX ORDER — PRAVASTATIN SODIUM 40 MG
40 TABLET ORAL DAILY
Qty: 90 TABLET | Refills: 1 | Status: SHIPPED | OUTPATIENT
Start: 2025-02-18 | End: 2025-02-20 | Stop reason: SDUPTHER

## 2025-02-20 DIAGNOSIS — I10 ESSENTIAL HYPERTENSION: ICD-10-CM

## 2025-02-20 DIAGNOSIS — E78.5 HYPERLIPIDEMIA, UNSPECIFIED HYPERLIPIDEMIA TYPE: ICD-10-CM

## 2025-02-21 RX ORDER — HYDROCHLOROTHIAZIDE 25 MG/1
50 TABLET ORAL DAILY
Qty: 180 TABLET | Refills: 0 | Status: SHIPPED | OUTPATIENT
Start: 2025-02-21

## 2025-02-21 RX ORDER — PRAVASTATIN SODIUM 40 MG
40 TABLET ORAL DAILY
Qty: 90 TABLET | Refills: 0 | Status: SHIPPED | OUTPATIENT
Start: 2025-02-21

## 2025-02-24 DIAGNOSIS — E11.65 TYPE 2 DIABETES MELLITUS WITH HYPERGLYCEMIA, WITHOUT LONG-TERM CURRENT USE OF INSULIN (HCC): ICD-10-CM

## 2025-02-24 RX ORDER — ORAL SEMAGLUTIDE 14 MG/1
1 TABLET ORAL DAILY
Qty: 90 TABLET | Refills: 0 | Status: SHIPPED | OUTPATIENT
Start: 2025-02-24 | End: 2025-02-27

## 2025-02-26 DIAGNOSIS — J31.0 CHRONIC RHINITIS: ICD-10-CM

## 2025-02-26 RX ORDER — MONTELUKAST SODIUM 10 MG/1
10 TABLET ORAL DAILY
Qty: 90 TABLET | Refills: 0 | Status: SHIPPED | OUTPATIENT
Start: 2025-02-26

## 2025-02-27 DIAGNOSIS — E11.65 TYPE 2 DIABETES MELLITUS WITH HYPERGLYCEMIA, WITHOUT LONG-TERM CURRENT USE OF INSULIN (HCC): Primary | ICD-10-CM

## 2025-02-27 NOTE — TELEPHONE ENCOUNTER
Patient informed that her insurance will not cover Rybelsus.  She is switched to Medicaid.  She is willing to try the Trulicity.  Medication has been sent into her pharmacy.  Patient is aware that she will go up to 1.5 mg on the next prescription

## 2025-02-28 DIAGNOSIS — E03.9 HYPOTHYROIDISM, UNSPECIFIED TYPE: ICD-10-CM

## 2025-03-01 DIAGNOSIS — E03.9 HYPOTHYROIDISM, UNSPECIFIED TYPE: ICD-10-CM

## 2025-03-03 DIAGNOSIS — E03.9 HYPOTHYROIDISM, UNSPECIFIED TYPE: ICD-10-CM

## 2025-03-03 RX ORDER — LEVOTHYROXINE SODIUM 175 UG/1
175 TABLET ORAL DAILY
Qty: 30 TABLET | OUTPATIENT
Start: 2025-03-03

## 2025-03-03 RX ORDER — LEVOTHYROXINE SODIUM 175 UG/1
175 TABLET ORAL DAILY
Qty: 90 TABLET | Refills: 0 | OUTPATIENT
Start: 2025-03-03

## 2025-03-03 RX ORDER — LEVOTHYROXINE SODIUM 175 UG/1
175 TABLET ORAL DAILY
Qty: 90 TABLET | Refills: 0 | Status: SHIPPED | OUTPATIENT
Start: 2025-03-03

## 2025-03-16 NOTE — TELEPHONE ENCOUNTER
Patients  stopped by the office to update their insurance information. Stated there was an issue with getting her medications before because of old insurance.   
Updated thank you  
No

## 2025-03-24 ENCOUNTER — PATIENT MESSAGE (OUTPATIENT)
Dept: INTERNAL MEDICINE CLINIC | Age: 65
End: 2025-03-24

## 2025-03-24 DIAGNOSIS — J31.0 CHRONIC RHINITIS: ICD-10-CM

## 2025-03-24 DIAGNOSIS — N95.1 MENOPAUSAL SYNDROME: ICD-10-CM

## 2025-03-24 DIAGNOSIS — E78.5 HYPERLIPIDEMIA, UNSPECIFIED HYPERLIPIDEMIA TYPE: ICD-10-CM

## 2025-03-24 DIAGNOSIS — R42 VERTIGO: ICD-10-CM

## 2025-03-24 DIAGNOSIS — E11.65 TYPE 2 DIABETES MELLITUS WITH HYPERGLYCEMIA, WITHOUT LONG-TERM CURRENT USE OF INSULIN (HCC): ICD-10-CM

## 2025-03-24 DIAGNOSIS — I10 ESSENTIAL HYPERTENSION: ICD-10-CM

## 2025-03-24 NOTE — TELEPHONE ENCOUNTER
Call patient. She would like to do an Evisit on 4/11-- she is scheduled 4/17?  She can change, just confirm the dates of the appointment. Will she need the refill now or at the appointment? (See 's note also)

## 2025-03-24 NOTE — TELEPHONE ENCOUNTER
Pt called back, address for pharmacy is [address], [place], [place], [address]. Pt is aware of the conflict of mychart visits [place] a different state and appt will be cancelled. Pt stated she will send a message of the medications she is needing.

## 2025-03-25 RX ORDER — PAROXETINE 10 MG/1
10 TABLET, FILM COATED ORAL EVERY MORNING
Qty: 90 TABLET | Refills: 0 | Status: SHIPPED | OUTPATIENT
Start: 2025-03-25

## 2025-03-25 RX ORDER — AMLODIPINE BESYLATE 5 MG/1
5 TABLET ORAL DAILY
Qty: 90 TABLET | Refills: 0 | Status: SHIPPED | OUTPATIENT
Start: 2025-03-25

## 2025-03-25 RX ORDER — HYDROCHLOROTHIAZIDE 25 MG/1
50 TABLET ORAL DAILY
Qty: 180 TABLET | Refills: 0 | Status: SHIPPED | OUTPATIENT
Start: 2025-03-25

## 2025-03-25 RX ORDER — MONTELUKAST SODIUM 10 MG/1
10 TABLET ORAL DAILY
Qty: 90 TABLET | Refills: 0 | Status: SHIPPED | OUTPATIENT
Start: 2025-03-25

## 2025-03-25 RX ORDER — MECLIZINE HYDROCHLORIDE 25 MG/1
25 TABLET ORAL 3 TIMES DAILY PRN
Qty: 30 TABLET | Refills: 0 | Status: SHIPPED | OUTPATIENT
Start: 2025-03-25

## 2025-03-25 RX ORDER — PRAVASTATIN SODIUM 40 MG
40 TABLET ORAL DAILY
Qty: 90 TABLET | Refills: 0 | Status: SHIPPED | OUTPATIENT
Start: 2025-03-25

## 2025-03-25 RX ORDER — LOSARTAN POTASSIUM 100 MG/1
100 TABLET ORAL DAILY
Qty: 90 TABLET | Refills: 0 | Status: SHIPPED | OUTPATIENT
Start: 2025-03-25

## 2025-03-25 RX ORDER — INSULIN GLARGINE 100 [IU]/ML
INJECTION, SOLUTION SUBCUTANEOUS
Qty: 15 ADJUSTABLE DOSE PRE-FILLED PEN SYRINGE | Refills: 0 | Status: SHIPPED | OUTPATIENT
Start: 2025-03-25

## 2025-03-25 RX ORDER — GLYBURIDE 5 MG/1
TABLET ORAL
Qty: 270 TABLET | Refills: 0 | Status: SHIPPED | OUTPATIENT
Start: 2025-03-25

## 2025-04-01 DIAGNOSIS — E11.65 TYPE 2 DIABETES MELLITUS WITH HYPERGLYCEMIA, WITHOUT LONG-TERM CURRENT USE OF INSULIN (HCC): ICD-10-CM

## 2025-04-01 RX ORDER — INSULIN GLARGINE 100 [IU]/ML
INJECTION, SOLUTION SUBCUTANEOUS
Qty: 15 ADJUSTABLE DOSE PRE-FILLED PEN SYRINGE | Refills: 0 | Status: SHIPPED | OUTPATIENT
Start: 2025-04-01

## 2025-05-29 DIAGNOSIS — E03.9 HYPOTHYROIDISM, UNSPECIFIED TYPE: ICD-10-CM

## 2025-05-29 RX ORDER — LEVOTHYROXINE SODIUM 175 UG/1
175 TABLET ORAL DAILY
Qty: 90 TABLET | Refills: 0 | OUTPATIENT
Start: 2025-05-29

## 2025-06-12 DIAGNOSIS — I10 ESSENTIAL HYPERTENSION: ICD-10-CM

## 2025-06-12 RX ORDER — LOSARTAN POTASSIUM 100 MG/1
100 TABLET ORAL DAILY
Qty: 90 TABLET | Refills: 0 | OUTPATIENT
Start: 2025-06-12

## 2025-06-18 DIAGNOSIS — N95.1 MENOPAUSAL SYNDROME: ICD-10-CM

## 2025-06-18 DIAGNOSIS — E11.65 TYPE 2 DIABETES MELLITUS WITH HYPERGLYCEMIA, WITHOUT LONG-TERM CURRENT USE OF INSULIN (HCC): ICD-10-CM

## 2025-06-18 RX ORDER — EMPAGLIFLOZIN 25 MG/1
25 TABLET, FILM COATED ORAL DAILY
Qty: 90 TABLET | Refills: 0 | OUTPATIENT
Start: 2025-06-18

## 2025-06-18 RX ORDER — GLYBURIDE 5 MG/1
TABLET ORAL
Qty: 270 TABLET | Refills: 0 | OUTPATIENT
Start: 2025-06-18

## 2025-06-18 RX ORDER — PAROXETINE 10 MG/1
10 TABLET, FILM COATED ORAL EVERY MORNING
Qty: 90 TABLET | Refills: 0 | OUTPATIENT
Start: 2025-06-18

## (undated) DEVICE — GLOVE ORANGE PI 7 1/2   MSG9075

## (undated) DEVICE — SPONGE GZ W4XL8IN COT WVN 12 PLY

## (undated) DEVICE — DRESSING TRNSPAR W6XL8IN FLM SURESITE 123

## (undated) DEVICE — 3M™ IOBAN™ 2 ANTIMICROBIAL INCISE DRAPE 6650EZ: Brand: IOBAN™ 2

## (undated) DEVICE — PAD,ABDOMINAL,5"X9",ST,LF,25/BX: Brand: MEDLINE INDUSTRIES, INC.

## (undated) DEVICE — SHOULDER PK

## (undated) DEVICE — AMBIENT SUPER TURBOVAC 90: Brand: COBLATION

## (undated) DEVICE — STERILE POLYISOPRENE POWDER-FREE SURGICAL GLOVES: Brand: PROTEXIS

## (undated) DEVICE — DRESSING,GAUZE,XEROFORM,CURAD,1"X8",ST: Brand: CURAD

## (undated) DEVICE — GLOVE SURG SZ 65 CRM LTX FREE POLYISOPRENE POLYMER BEAD ANTI

## (undated) DEVICE — SUTURE ETHLN SZ 3-0 L30IN NONABSORBABLE BLK FS-1 L24MM 3/8 669H

## (undated) DEVICE — CANNULA ARTHSCP L7CM DIA6MM CONIC TIP THRD NONSHIELDED DISP

## (undated) DEVICE — PASSER SUT 25DEG L CRV NIT WIRE LOOP 2 FIBERSTICK MFIL SUT

## (undated) DEVICE — PAD THER SHLDR UNIV CLD

## (undated) DEVICE — TUBING PMP L16FT MAIN DISP FOR AR-6400 AR-6475

## (undated) DEVICE — TOWEL,OR,DSP,ST,BLUE,STD,6/PK,12PK/CS: Brand: MEDLINE

## (undated) DEVICE — [AGGRESSIVE PLUS CUTTER, ARTHROSCOPIC SHAVER BLADE,  DO NOT RESTERILIZE,  DO NOT USE IF PACKAGE IS DAMAGED,  KEEP DRY,  KEEP AWAY FROM SUNLIGHT]: Brand: FORMULA

## (undated) DEVICE — 3M™ STERI-DRAPE™ U-DRAPE 1067 1067 5/BX 4BX/CS/CTN&#X20;: Brand: STERI-DRAPE™

## (undated) DEVICE — GLOVE SURG SZ 65 L12IN FNGR THK79MIL GRN LTX FREE

## (undated) DEVICE — SUTURE MAXBRAID SZ 2 NONABSORBABLE BLU C-7 TAPR NDL 900335

## (undated) DEVICE — GAUZE,SPONGE,4"X4",16PLY,XRAY,STRL,LF: Brand: MEDLINE

## (undated) DEVICE — GLOVE SURG SZ 8 L12IN THK75MIL DK GRN LTX FREE

## (undated) DEVICE — TUBING, SUCTION, 9/32" X 10', STRAIGHT: Brand: MEDLINE

## (undated) DEVICE — THREE QUARTER SHEET: Brand: CONVERTORS